# Patient Record
Sex: MALE | Race: WHITE | NOT HISPANIC OR LATINO
[De-identification: names, ages, dates, MRNs, and addresses within clinical notes are randomized per-mention and may not be internally consistent; named-entity substitution may affect disease eponyms.]

---

## 2023-01-24 ENCOUNTER — TRANSCRIPTION ENCOUNTER (OUTPATIENT)
Age: 63
End: 2023-01-24

## 2023-01-25 ENCOUNTER — OUTPATIENT (OUTPATIENT)
Dept: OUTPATIENT SERVICES | Facility: HOSPITAL | Age: 63
LOS: 1 days | End: 2023-01-25
Payer: COMMERCIAL

## 2023-01-25 DIAGNOSIS — Z12.11 ENCOUNTER FOR SCREENING FOR MALIGNANT NEOPLASM OF COLON: ICD-10-CM

## 2023-01-25 PROCEDURE — G0105: CPT

## 2023-10-26 ENCOUNTER — NON-APPOINTMENT (OUTPATIENT)
Age: 63
End: 2023-10-26

## 2023-10-26 ENCOUNTER — APPOINTMENT (OUTPATIENT)
Dept: NEUROSURGERY | Facility: CLINIC | Age: 63
End: 2023-10-26
Payer: COMMERCIAL

## 2023-10-26 VITALS
WEIGHT: 250 LBS | HEIGHT: 70 IN | BODY MASS INDEX: 35.79 KG/M2 | SYSTOLIC BLOOD PRESSURE: 114 MMHG | DIASTOLIC BLOOD PRESSURE: 78 MMHG | OXYGEN SATURATION: 96 % | HEART RATE: 69 BPM

## 2023-10-26 DIAGNOSIS — Z78.9 OTHER SPECIFIED HEALTH STATUS: ICD-10-CM

## 2023-10-26 DIAGNOSIS — Z82.61 FAMILY HISTORY OF ARTHRITIS: ICD-10-CM

## 2023-10-26 DIAGNOSIS — E78.1 PURE HYPERGLYCERIDEMIA: ICD-10-CM

## 2023-10-26 DIAGNOSIS — R20.0 ANESTHESIA OF SKIN: ICD-10-CM

## 2023-10-26 PROCEDURE — 99202 OFFICE O/P NEW SF 15 MIN: CPT

## 2023-10-26 RX ORDER — GABAPENTIN 300 MG
300 TABLET ORAL
Refills: 0 | Status: ACTIVE | COMMUNITY

## 2023-10-27 PROBLEM — R20.0 NUMBNESS, LIMB: Status: ACTIVE | Noted: 2023-10-27

## 2024-09-16 ENCOUNTER — INPATIENT (INPATIENT)
Facility: HOSPITAL | Age: 64
LOS: 10 days | Discharge: HOME | DRG: 566 | End: 2024-09-27
Attending: PHYSICAL MEDICINE & REHABILITATION | Admitting: PHYSICAL MEDICINE & REHABILITATION
Payer: COMMERCIAL

## 2024-09-16 VITALS
TEMPERATURE: 98 F | OXYGEN SATURATION: 98 % | SYSTOLIC BLOOD PRESSURE: 128 MMHG | HEIGHT: 70 IN | WEIGHT: 246.92 LBS | DIASTOLIC BLOOD PRESSURE: 77 MMHG | HEART RATE: 91 BPM | RESPIRATION RATE: 16 BRPM

## 2024-09-16 DIAGNOSIS — Z98.890 OTHER SPECIFIED POSTPROCEDURAL STATES: Chronic | ICD-10-CM

## 2024-09-16 DIAGNOSIS — Z96.653 PRESENCE OF ARTIFICIAL KNEE JOINT, BILATERAL: ICD-10-CM

## 2024-09-16 LAB — SARS-COV-2 RNA SPEC QL NAA+PROBE: SIGNIFICANT CHANGE UP

## 2024-09-16 RX ORDER — PANTOPRAZOLE SODIUM 40 MG/1
40 TABLET, DELAYED RELEASE ORAL
Refills: 0 | Status: DISCONTINUED | OUTPATIENT
Start: 2024-09-16 | End: 2024-09-27

## 2024-09-16 RX ORDER — SENNOSIDES 8.6 MG
2 TABLET ORAL AT BEDTIME
Refills: 0 | Status: DISCONTINUED | OUTPATIENT
Start: 2024-09-16 | End: 2024-09-17

## 2024-09-16 RX ORDER — ASPIRIN 325 MG
325 TABLET ORAL
Refills: 0 | Status: DISCONTINUED | OUTPATIENT
Start: 2024-09-16 | End: 2024-09-27

## 2024-09-16 RX ORDER — ACETAMINOPHEN 325 MG
975 TABLET ORAL EVERY 6 HOURS
Refills: 0 | Status: DISCONTINUED | OUTPATIENT
Start: 2024-09-16 | End: 2024-09-27

## 2024-09-16 RX ORDER — OXYCODONE HYDROCHLORIDE 30 MG/1
5 TABLET, FILM COATED, EXTENDED RELEASE ORAL EVERY 6 HOURS
Refills: 0 | Status: DISCONTINUED | OUTPATIENT
Start: 2024-09-16 | End: 2024-09-16

## 2024-09-16 RX ORDER — CRANBERRY FRUIT EXTRACT 650 MG
1000 CAPSULE ORAL DAILY
Refills: 0 | Status: DISCONTINUED | OUTPATIENT
Start: 2024-09-16 | End: 2024-09-27

## 2024-09-16 RX ORDER — OXYCODONE HYDROCHLORIDE 30 MG/1
10 TABLET, FILM COATED, EXTENDED RELEASE ORAL EVERY 6 HOURS
Refills: 0 | Status: DISCONTINUED | OUTPATIENT
Start: 2024-09-16 | End: 2024-09-16

## 2024-09-16 RX ORDER — GEMFIBROZIL 600 MG/1
600 TABLET, FILM COATED ORAL
Refills: 0 | Status: DISCONTINUED | OUTPATIENT
Start: 2024-09-16 | End: 2024-09-27

## 2024-09-16 RX ORDER — CYCLOBENZAPRINE HCL 10 MG
5 TABLET ORAL THREE TIMES A DAY
Refills: 0 | Status: DISCONTINUED | OUTPATIENT
Start: 2024-09-16 | End: 2024-09-27

## 2024-09-16 RX ORDER — GABAPENTIN 800 MG/1
600 TABLET, FILM COATED ORAL AT BEDTIME
Refills: 0 | Status: DISCONTINUED | OUTPATIENT
Start: 2024-09-16 | End: 2024-09-27

## 2024-09-16 RX ADMIN — Medication 2 TABLET(S): at 21:51

## 2024-09-16 RX ADMIN — Medication 975 MILLIGRAM(S): at 21:51

## 2024-09-16 RX ADMIN — GABAPENTIN 600 MILLIGRAM(S): 800 TABLET, FILM COATED ORAL at 21:58

## 2024-09-16 NOTE — H&P ADULT - NSHPPHYSICALEXAM_GEN_ALL_CORE
PHYSICAL EXAM  VITALS  T(C): 36.4 (09-16-24 @ 18:24), Max: 36.4 (09-16-24 @ 18:24)  HR: 91 (09-16-24 @ 18:24) (91 - 91)  BP: 128/77 (09-16-24 @ 18:24) (128/77 - 128/77)  RR: 16 (09-16-24 @ 18:24) (16 - 16)  SpO2: 98% (09-16-24 @ 18:24) (98% - 98%)    Gen - NAD, Comfortable  HEENT - NCAT, EOMI, MMM, PERRLA, Normal Conjunctivae  Neck - Supple, No limited ROM  Pulm - CTAB, No wheeze, No rhonchi, No crackles  Cardiovascular - RRR, S1S2, No murmurs  Chest - good chest expansion, good respiratory effort  Abdomen - Soft, NT/ND, +BS  Extremities - No C/C, no calf tenderness, BLE 1+ edema  Neuro-     Cognitive - awake, alert, oriented to person, place, date, year, and situation.  Able  to follow command     Communication - Fluent, Comprehensible, No dysarthria, No aphasia      Cranial Nerves -No facial asymmetry, Tongue midline, EOMI, Shoulder shrug intact     Motor - No focal deficits. Right /Left hemiparesis                    LEFT    UE - ShAB 5/5, EF 5/5, EE 5/5,  5/5                    RIGHT UE - ShAB 5/5, EF 5/5, EE 5/5,   5/5                    LEFT    LE - HF 5/5, KE 3/5, DF 5/5, PF 5/5                    RIGHT LE - HF 5/5, KE 2/5 limited 2/2 pain, DF 5/5, PF 5/5        Sensory - Intact  to LT bilaterally, decreased sensation to RLE (chronic)     Tone - normal  MSK: BL knee soreness  Psychiatric - Mood stable, Affect WNL  Skin:  LUE bruising, BLE ecchymosis, mepilex BL knees, CDI, L buttock scab

## 2024-09-16 NOTE — PATIENT PROFILE ADULT - FALL HARM RISK - RISK INTERVENTIONS
Assistance OOB with selected safe patient handling equipment/Assistance with ambulation/Communicate Fall Risk and Risk Factors to all staff, patient, and family/Discuss with provider need for PT consult/Monitor gait and stability/Reinforce activity limits and safety measures with patient and family/Visual Cue: Yellow wristband/Bed in lowest position, wheels locked, appropriate side rails in place/Call bell, personal items and telephone in reach/Instruct patient to call for assistance before getting out of bed or chair/Non-slip footwear when patient is out of bed/Foster to call system/Physically safe environment - no spills, clutter or unnecessary equipment/Purposeful Proactive Rounding/Room/bathroom lighting operational, light cord in reach

## 2024-09-16 NOTE — H&P ADULT - NSHPSOCIALHISTORY_GEN_ALL_CORE
Housing and DME: Lives alone with family nearby. Live in 3 story house with ramp to the front, chair lift to laundry. Walk in shower.     Prior Functional Status:  Patient was independent with ambulation, needed assistance for ADLs, and transfers with no AD    Current Functional Status:    09/15/2024 PT note:   Supine to/from sit:  CGA  Sit to/from stand:  CGA  RW Ambulation: 40 feet, CGA, RW, step through gait, assist present for WC follow    09/16/2024 OT note   LE Dressing without assistive device; Minimal assist    UE Dressing; LE Dressing with assisstive device;   without assistive device Pt requires CGA for most functional mobility at this time.   Pt req CGA for toileting transfers, and performs hygiene independently while seated.   He is able to perform basic grooming standing at the sink with CGA as well.  He is independent with feeding and UB dressing while seated.   Pt req min A for LB dressing and bathing

## 2024-09-16 NOTE — H&P ADULT - NSICDXPASTMEDICALHX_GEN_ALL_CORE_FT
PAST MEDICAL HISTORY:  Colostomy status     Diverticulitis of colon without hemorrhage     Hyperlipidemia      PAST MEDICAL HISTORY:  Colostomy status     Diverticulitis of colon without hemorrhage     Hyperlipidemia     Osteoarthritis

## 2024-09-16 NOTE — PATIENT PROFILE ADULT - DO YOU FEEL UNSAFE AT HOME, WORK, OR SCHOOL?
"Progress Note - Freddie Graham 64 y.o. male MRN: [id number]    Unit/Bed#: [facility] 251-02 Encounter: [id number]        Subjective:   Patient seen and examined at bedside after reviewing the chart and discussing the case with the caring staff.      Patient examined at bedside.  Patient has no acute symptoms.     Physical Exam   Vitals: Blood pressure 122/65, pulse 62, temperature (!) 97.2 °F (36.2 °C), temperature source Temporal, resp. rate 16, height 5' 9\" (1.753 m), weight 90.4 kg (199 lb 3.2 oz), SpO2 99%.,Body mass index is 29.42 kg/m².  Constitutional: Patient in no acute distress.  HEENT: PERR, EOMI, MMM.  Cardiovascular: Normal rate and regular rhythm.    Pulmonary/Chest: Effort normal and breath sounds normal.   Abdomen: Soft, + BS, NT.    Assessment/Plan:  Freddie Graham is a(n) 64 y.o. year old male schizophrenia.     1.  Cardiac with hx HTN, HLD.  I will put the patient on lisinopril 10 mg daily.  Toprol-XL will be discontinued due to noncompliance 2/25/2024.  2.  Tobacco abuse.  NRT.  3.  Hypothyroidism.  Not on levothyroxine.  TSH normal 2/20/24.  4.  BPH.  Continue Flomax 0.4 mg daily.  5.  Bilateral foot blisters.  SurePrep twice daily.  Podiatry consulted.  Add bacitracin twice daily.  I will get surgical shoe for the patient 2/24/2024.  6.  Vitamin D deficiency.  Patient started on vitamin D supplements.  7.  Vitamin B12 deficiency.  Patient started on vitamin B12 supplements.  8. Constipation. Senakot daily and Miralax as needed.   9. Dental caries/infection. Start patient on amoxicillin 500 mg TID for 7 days and magic mouthwash as needed. F/u with dentist outpatient.   10. DJD/back pain. Start patient on Voltaren gel four times daily.   " no

## 2024-09-16 NOTE — H&P ADULT - NSICDXFAMILYHX_GEN_ALL_CORE_FT
FAMILY HISTORY:  Father  Still living? Unknown  Family history of diverticulitis of colon, Age at diagnosis: Age Unknown    Mother  Still living? Yes, Estimated age: 81-90  Family history of diverticulitis of colon, Age at diagnosis: Age Unknown

## 2024-09-16 NOTE — H&P ADULT - HISTORY OF PRESENT ILLNESS
JENELLE COLEMAN is a 63 yo male with PMHx of high cholesterol, primary OA of b/l knees,  and diverticulitis s/p colectomy who presented to [facility] for elective bilateral total knee replacement. Patient had B/L TKR on 9/10/2024 with Dr. Thierno Tan.     Hospital Course was complicated by _______________     JENELLE COLEMAN is a 63 yo male with PMHx of high cholesterol, primary OA of b/l knees,  and diverticulitis s/p colectomy who presented to [facility] for elective bilateral total knee replacement with failed conservative treatment with medications. Patient had B/L TKR on 9/10/2024 with Dr. Thierno Tan. Hospital course unremarkable and without complications. Patient optimized, pain is well controlled and was evaluated by PM&R and therapy for functional deficits, gait/ADL impairments and acute rehabilitation was recommended. Patient was cleared for discharge to [facility] IRU on 9/16/24.

## 2024-09-16 NOTE — H&P ADULT - NSHPLABSRESULTS_GEN_ALL_CORE
LABS    CBC  9/12/24  WBC- 7.8  HgB-  9.4  Hct- 27.8  Platelets- 139    Chem: 9/12/24  Na- 136  K- 4.3  Cr- 0.7  BUN- 15  Glu- 121    RADIOLOGY    8/20/2024  XRAY  B/L Knees: Bilateral OA affecting medical compartments. Kellgren- Nghia grade 4  XRAY  Chest: No evidence of acute pulmonary embolism

## 2024-09-16 NOTE — H&P ADULT - NS ATTEND AMEND GEN_ALL_CORE FT
Rehab Attending- Patient seen and examined by me on 9/17 - Case discussed, above note reviewed by me with modifications made    patient seen and evaluated bedside  reports numbness/ burning pain LEs - will increase gabapentin to 3x/day  moved bowels yesterday- Senna/ miralx daily  voiding well  good ROM Both knees 0-90 SLR RLE 3+/5 Left 3-/5  TEDs BLEs when OOB    IMP Rehab quyen TKR- good candidate for intensive rehab- will tolerate three hours rehab daily      MEDICATIONS  (STANDING):  acetaminophen     Tablet .. 975 milliGRAM(s) Oral every 6 hours  aspirin 325 milliGRAM(s) Oral two times a day  cholecalciferol 1000 Unit(s) Oral daily  gabapentin 300 milliGRAM(s) Oral <User Schedule>  gabapentin 600 milliGRAM(s) Oral at bedtime  gemfibrozil 600 milliGRAM(s) Oral <User Schedule>  pantoprazole    Tablet 40 milliGRAM(s) Oral before breakfast  polyethylene glycol 3350 17 Gram(s) Oral daily    MEDICATIONS  (PRN):  cyclobenzaprine 5 milliGRAM(s) Oral three times a day PRN Muscle Spasm  oxyCODONE    IR 10 milliGRAM(s) Oral every 6 hours PRN Severe Pain (7 - 10)  oxyCODONE    IR 5 milliGRAM(s) Oral every 6 hours PRN Moderate Pain (4 - 6)  senna 2 Tablet(s) Oral at bedtime PRN Constipation                 9.5    5.79  )-----------( 227      ( 17 Sep 2024 05:40 )             28.7     09-17    142  |  104  |  17  ----------------------------<  100[H]  4.2   |  33[H]  |  0.85    Ca    9.2      17 Sep 2024 05:40    TPro  6.1  /  Alb  2.9[L]  /  TBili  1.0  /  DBili  x   /  AST  37  /  ALT  43  /  AlkPhos  69  09-17    Urinalysis Basic - ( 17 Sep 2024 05:40 )    Color: x / Appearance: x / SG: x / pH: x  Gluc: 100 mg/dL / Ketone: x  / Bili: x / Urobili: x   Blood: x / Protein: x / Nitrite: x   Leuk Esterase: x / RBC: x / WBC x   Sq Epi: x / Non Sq Epi: x / Bacteria: x    Vital Signs Last 24 Hrs  T(C): 36.4 (17 Sep 2024 08:48), Max: 36.7 (16 Sep 2024 20:23)  T(F): 97.6 (17 Sep 2024 08:48), Max: 98 (16 Sep 2024 20:23)  HR: 73 (17 Sep 2024 08:48) (73 - 91)  BP: 121/77 (17 Sep 2024 08:48) (121/77 - 128/77)  BP(mean): --  RR: 16 (17 Sep 2024 08:48) (16 - 17)  SpO2: 96% (17 Sep 2024 08:48) (96% - 98%)    Parameters below as of 17 Sep 2024 08:48  Patient On (Oxygen Delivery Method): room air    Gen - NAD, Comfortable  HEENT - NCAT, EOMI, MMM, PERRLA, Normal Conjunctivae  Neck - Supple, No limited ROM  Pulm - CTAB, No wheeze, No rhonchi, No crackles  Cardiovascular - RRR, S1S2, No murmurs  Chest - good chest expansion, good respiratory effort  Abdomen - Soft, NT/ND, +BS  Extremities - No C/C, no calf tenderness, BLE 1+ edema  Neuro-     Cognitive - awake, alert, oriented to person, place, date, year, and situation.  Able  to follow command     Communication - Fluent, Comprehensible, No dysarthria, No aphasia      Cranial Nerves -No facial asymmetry, Tongue midline, EOMI, Shoulder shrug intact     Motor - No focal deficits. Right /Left hemiparesis                    LEFT    UE - ShAB 5/5, EF 5/5, EE 5/5,  5/5                    RIGHT UE - ShAB 5/5, EF 5/5, EE 5/5,   5/5                    LEFT    LE - HF 5/5, KE 3/5, DF 5/5, PF 5/5                    RIGHT LE - HF 5/5, KE 2/5 limited 2/2 pain, DF 5/5, PF 5/5        Sensory - Intact  to LT bilaterally, decreased sensation to RLE (chronic)     Tone - normal  MSK: BL knee soreness

## 2024-09-16 NOTE — H&P ADULT - NSICDXPASTSURGICALHX_GEN_ALL_CORE_FT
PAST SURGICAL HISTORY:  H/O colonoscopy     History of colon resection February 2015    S/P meniscectomy

## 2024-09-16 NOTE — H&P ADULT - ASSESSMENT
JENELLE COLEMAN is a 65 yo male with PMHx of high cholesterol, primary OA of b/l knees,  and diverticulitis s/p colectomy who presented to [facility] for elective bilateral total knee replacement. Patient had B/L TKR on 9/10/2024 with Dr. Thierno Tan. Hospital Course complicated by __________. Admitted for multidisciplinary rehab program    #Bilateral Total Knee Replacment  - B/L TKR on 9/10 with Dr. Thierno Tan  - Continue multimodal pain control with Tylenol 975 QID, Mobic 7.5 mg D, and Gabapentin 600 mg HS  - Oxycodone as needed for break through pain  - Flexeril prn  -  mg BID for VTE ppx s/p B/L TKR  - Fall Precautions   - Comprehensive Multidisciplinary Rehab Program: Gait, ADL, Functional impairments; PT/OT/ SLP 3 hours a day 5 days a week      #Mood / Cognition:  - No concerns    #Sleep:  - Maintain quiet hours and low stim environment  - Melatonin PRN    #GI/Bowel:  - Scheduled Colace 100 mg BID  - PRN: Miralax 17 GM BID, Senna 2 tabs daily  - Simethicone prn  - GI ppx: Protonix 40 mg dialy    #/Bladder:  - Monitor PVR if no void in 8h; SC for >400 cc  - Toileting schedule q4h    #Diet / Dysphagia:    - Diet: Regular/TLC    #Skin/ Pressure Injury Prevention:  - assessment on admission   - Incisions: ______________  - Turn Q2hrs in bed while awake, OOB to Chair, PT/OT/SLP     #DVT prophylaxis:  -   BID  - SCDs  - Last doppler on _____________    #Precautions/ Restrictions  - Falls  - Ortho:      Weight bearing status: _____________     ROM restrictions: _______________  - COVID PCR: Pending    --------------------------------------------  Outpatient Follow up:    _________________________      --------------------------------------------      MEDICAL PROGNOSIS: GOOD            REHAB POTENTIAL: GOOD             ESTIMATED DISPOSITION: HOME WITH HOME CARE            ELOS: 10-14 Days   EXPECTED THERAPY:     P.T. 1hr/day       O.T. 1hr/day      S.L.P. 1hr/day     P&O Unnecessary     EXP FREQUENCY: 5 days per 7 day period     PRESCREEN COMPARISON:   I have reviewed the prescreen information and I have found no relevant changes between the preadmission screening and my post admission evaluation     RATIONALE FOR INPATIENT ADMISSION - Patient demonstrates the following: (check all that apply)  [X] Medically appropriate for rehabilitation admission  [X] Has attainable rehab goals with an appropriate initial discharge plan  [X] Has rehabilitation potential (expected to make a significant improvement within a reasonable period of time)   [X] Requires close medical management by a rehab physician, rehab nursing care, Hospitalist and comprehensive interdisciplinary team (including PT, OT, & or SLP, Prosthetics and Orthotics)   JENELLE COLEMAN is a 65 yo male with PMHx of high cholesterol, primary OA of b/l knees,  and diverticulitis s/p colectomy who presented to [facility] for elective bilateral total knee replacement. Patient had B/L TKR on 9/10/2024 with Dr. Thierno Tan. Hospital course unremarkable and without complications. Now admitted to [facility] after for initiation of a multidisciplinary rehab program consisting focused on functional mobility, transfers and ADLs.    #Bilateral Total Knee Replacement  - B/L TKR on 9/10 with Dr. Thierno Tan  - Continue multimodal pain control with Tylenol 975 QID, Mobic 7.5 mg D, and Gabapentin 600 mg HS  - Oxycodone as needed for break through pain  - Flexeril prn  -  mg BID for VTE ppx s/p B/L TKR x 42 days  - Fall Precautions   - Comprehensive Multidisciplinary Rehab Program: Gait, ADL, Functional impairments; PT/OT/ SLP 3 hours a day 5 days a week  -Vit D 1000U daily    #HLD  -Lopid 600mg BID     #Sleep:  - Maintain quiet hours and low stim environment  - Melatonin 6mg PRN    #GI/Bowel:  - PRN: Miralax 17 GM BID, Senna 2 tabs daily  - Simethicone prn  - GI ppx: Protonix 40 mg daily    #/Bladder:  - Bladder scan x 1 on admission; SC for >400 cc  - Toileting schedule q4h    #Diet / Dysphagia:    - Diet: Regular/TLC    #Skin/ Pressure Injury Prevention:  - assessment on admission     #DVT prophylaxis:  -   BID x 42 days  - TEDs    #Precautions/ Restrictions  - Falls  - Ortho:      Weight bearing status: BLE WBAT    --------------------------------------------  Outpatient Follow up:    _________________________      --------------------------------------------      MEDICAL PROGNOSIS: GOOD            REHAB POTENTIAL: GOOD             ESTIMATED DISPOSITION: HOME WITH HOME CARE            ELOS: 10-14 Days   EXPECTED THERAPY:     P.T. 1hr/day       O.T. 1hr/day      S.L.P. 1hr/day     P&O Unnecessary     EXP FREQUENCY: 5 days per 7 day period     PRESCREEN COMPARISON:   I have reviewed the prescreen information and I have found no relevant changes between the preadmission screening and my post admission evaluation     RATIONALE FOR INPATIENT ADMISSION - Patient demonstrates the following: (check all that apply)  [X] Medically appropriate for rehabilitation admission  [X] Has attainable rehab goals with an appropriate initial discharge plan  [X] Has rehabilitation potential (expected to make a significant improvement within a reasonable period of time)   [X] Requires close medical management by a rehab physician, rehab nursing care, Hospitalist and comprehensive interdisciplinary team (including PT, OT, & or SLP, Prosthetics and Orthotics)     JENELLE COLEMAN is a 63 yo male with PMHx of high cholesterol, primary OA of b/l knees,  and diverticulitis s/p colectomy who presented to [facility] for elective bilateral total knee replacement. Patient had B/L TKR on 9/10/2024 with Dr. Thierno Tan. Hospital course unremarkable and without complications. Now admitted to [facility] after for initiation of a multidisciplinary rehab program consisting focused on functional mobility, transfers and ADLs.    #Bilateral Total Knee Replacement  - B/L TKR on 9/10 with Dr. Thierno Tan  - Continue multimodal pain control with Tylenol 975 QID, and Gabapentin 600 mg HS  - Oxycodone 5-10mg PRN    - Flexeril 5mg TID PRN  -  mg BID for VTE ppx s/p B/L TKR x 42 days (end date 10/22)  - Fall Precautions   - Comprehensive Multidisciplinary Rehab Program: Gait, ADL, Functional impairments; PT/OT/ SLP 3 hours a day 5 days a week  -Vit D 1000U daily  -Remove mepilex dressing on 9/17 (POD#7)    #HLD  -Lopid 600mg BID     #Sleep:  - Maintain quiet hours and low stim environment  - Melatonin 6mg PRN    #GI/Bowel:  - PRN: Miralax 17 GM BID, Senna 2 tabs daily  - GI ppx: Protonix 40 mg daily    #/Bladder:  - Bladder scan x 1 on admission; SC for >400 cc  - Toileting schedule q4h    #Diet / Dysphagia:    - Diet: Regular/TLC    #Skin/ Pressure Injury Prevention:  - assessment on admission:  LUE bruising, BLE ecchymosis, mepilex BL knees, CDI, L buttock scab       #DVT prophylaxis:  -   BID x 42 days (end date 10/22)  - TEDs    #Precautions/ Restrictions  - Falls  - Ortho:      Weight bearing status: BLE WBAT    --------------------------------------------  Outpatient Follow up:    Thierno Solorio MD  Orthopedic Surgeon  [address]  [address]  [place], [place], [address]    MEDICAL PROGNOSIS: GOOD            REHAB POTENTIAL: GOOD             ESTIMATED DISPOSITION: HOME WITH HOME CARE            ELOS: 10-14 Days   EXPECTED THERAPY:     P.T. 1hr/day       O.T. 1hr/day      S.L.P. 1hr/day     P&O Unnecessary     EXP FREQUENCY: 5 days per 7 day period     PRESCREEN COMPARISON:   I have reviewed the prescreen information and I have found no relevant changes between the preadmission screening and my post admission evaluation     RATIONALE FOR INPATIENT ADMISSION - Patient demonstrates the following: (check all that apply)  [X] Medically appropriate for rehabilitation admission  [X] Has attainable rehab goals with an appropriate initial discharge plan  [X] Has rehabilitation potential (expected to make a significant improvement within a reasonable period of time)   [X] Requires close medical management by a rehab physician, rehab nursing care, Hospitalist and comprehensive interdisciplinary team (including PT, OT, & or SLP, Prosthetics and Orthotics)

## 2024-09-16 NOTE — H&P ADULT - NSHPREVIEWOFSYSTEMS_GEN_ALL_CORE
REVIEW OF SYSTEMS  Constitutional: No fever, No Chills, No fatigue  HEENT: No eye pain, No visual disturbances, No difficulty hearing  Pulm: No cough,  No shortness of breath  Cardio: No chest pain, No palpitations  GI:  No abdominal pain, No nausea, No vomiting, No diarrhea, No constipation  : No dysuria, No frequency, No hematuria  Neuro: No headaches, No memory loss, + weakness, + numbness, No tremors  Skin: No itching, No rashes, No lesions   Endo: No temperature intolerance  MSK: No joint pain, No joint swelling, No muscle pain, No Neck pain,  No back pain  Psych:  No depression, No anxiety REVIEW OF SYSTEMS  Constitutional: No fever, No Chills, No fatigue  HEENT: No eye pain, No visual disturbances, No difficulty hearing  Pulm: No cough,  No shortness of breath  Cardio: No chest pain, No palpitations  GI:  No abdominal pain, No nausea, No vomiting, No diarrhea, No constipation  : No dysuria, No frequency, No hematuria  Neuro: No headaches, No memory loss, + weakness, + numbness RLE (chronic), No tremors  Skin: No itching, No rashes, No lesions   Endo: No temperature intolerance  MSK: +BL knee pain, No joint swelling, No muscle pain, No Neck pain,  No back pain  Psych:  No depression, No anxiety

## 2024-09-17 LAB
ALBUMIN SERPL ELPH-MCNC: 2.9 G/DL — LOW (ref 3.3–5)
ALP SERPL-CCNC: 69 U/L — SIGNIFICANT CHANGE UP (ref 40–120)
ALT FLD-CCNC: 43 U/L — SIGNIFICANT CHANGE UP (ref 10–45)
ANION GAP SERPL CALC-SCNC: 5 MMOL/L — SIGNIFICANT CHANGE UP (ref 5–17)
AST SERPL-CCNC: 37 U/L — SIGNIFICANT CHANGE UP (ref 10–40)
BASOPHILS # BLD AUTO: 0.06 K/UL — SIGNIFICANT CHANGE UP (ref 0–0.2)
BASOPHILS NFR BLD AUTO: 1 % — SIGNIFICANT CHANGE UP (ref 0–2)
BILIRUB SERPL-MCNC: 1 MG/DL — SIGNIFICANT CHANGE UP (ref 0.2–1.2)
BUN SERPL-MCNC: 17 MG/DL — SIGNIFICANT CHANGE UP (ref 7–23)
CALCIUM SERPL-MCNC: 9.2 MG/DL — SIGNIFICANT CHANGE UP (ref 8.4–10.5)
CHLORIDE SERPL-SCNC: 104 MMOL/L — SIGNIFICANT CHANGE UP (ref 96–108)
CO2 SERPL-SCNC: 33 MMOL/L — HIGH (ref 22–31)
CREAT SERPL-MCNC: 0.85 MG/DL — SIGNIFICANT CHANGE UP (ref 0.5–1.3)
EGFR: 96 ML/MIN/1.73M2 — SIGNIFICANT CHANGE UP
EOSINOPHIL # BLD AUTO: 0.24 K/UL — SIGNIFICANT CHANGE UP (ref 0–0.5)
EOSINOPHIL NFR BLD AUTO: 4.1 % — SIGNIFICANT CHANGE UP (ref 0–6)
GLUCOSE SERPL-MCNC: 100 MG/DL — HIGH (ref 70–99)
HCT VFR BLD CALC: 28.7 % — LOW (ref 39–50)
HGB BLD-MCNC: 9.5 G/DL — LOW (ref 13–17)
IMM GRANULOCYTES NFR BLD AUTO: 0.3 % — SIGNIFICANT CHANGE UP (ref 0–0.9)
LYMPHOCYTES # BLD AUTO: 1.56 K/UL — SIGNIFICANT CHANGE UP (ref 1–3.3)
LYMPHOCYTES # BLD AUTO: 26.9 % — SIGNIFICANT CHANGE UP (ref 13–44)
MCHC RBC-ENTMCNC: 30.6 PG — SIGNIFICANT CHANGE UP (ref 27–34)
MCHC RBC-ENTMCNC: 33.1 GM/DL — SIGNIFICANT CHANGE UP (ref 32–36)
MCV RBC AUTO: 92.6 FL — SIGNIFICANT CHANGE UP (ref 80–100)
MONOCYTES # BLD AUTO: 0.79 K/UL — SIGNIFICANT CHANGE UP (ref 0–0.9)
MONOCYTES NFR BLD AUTO: 13.6 % — SIGNIFICANT CHANGE UP (ref 2–14)
NEUTROPHILS # BLD AUTO: 3.12 K/UL — SIGNIFICANT CHANGE UP (ref 1.8–7.4)
NEUTROPHILS NFR BLD AUTO: 54.1 % — SIGNIFICANT CHANGE UP (ref 43–77)
NRBC # BLD: 0 /100 WBCS — SIGNIFICANT CHANGE UP (ref 0–0)
PLATELET # BLD AUTO: 227 K/UL — SIGNIFICANT CHANGE UP (ref 150–400)
POTASSIUM SERPL-MCNC: 4.2 MMOL/L — SIGNIFICANT CHANGE UP (ref 3.5–5.3)
POTASSIUM SERPL-SCNC: 4.2 MMOL/L — SIGNIFICANT CHANGE UP (ref 3.5–5.3)
PROT SERPL-MCNC: 6.1 G/DL — SIGNIFICANT CHANGE UP (ref 6–8.3)
RBC # BLD: 3.1 M/UL — LOW (ref 4.2–5.8)
RBC # FLD: 14.1 % — SIGNIFICANT CHANGE UP (ref 10.3–14.5)
SODIUM SERPL-SCNC: 142 MMOL/L — SIGNIFICANT CHANGE UP (ref 135–145)
WBC # BLD: 5.79 K/UL — SIGNIFICANT CHANGE UP (ref 3.8–10.5)
WBC # FLD AUTO: 5.79 K/UL — SIGNIFICANT CHANGE UP (ref 3.8–10.5)

## 2024-09-17 PROCEDURE — 99222 1ST HOSP IP/OBS MODERATE 55: CPT | Mod: GC

## 2024-09-17 PROCEDURE — 99253 IP/OBS CNSLTJ NEW/EST LOW 45: CPT

## 2024-09-17 PROCEDURE — 99221 1ST HOSP IP/OBS SF/LOW 40: CPT

## 2024-09-17 RX ORDER — SENNOSIDES 8.6 MG
2 TABLET ORAL AT BEDTIME
Refills: 0 | Status: DISCONTINUED | OUTPATIENT
Start: 2024-09-17 | End: 2024-09-27

## 2024-09-17 RX ORDER — GABAPENTIN 800 MG/1
300 TABLET, FILM COATED ORAL
Refills: 0 | Status: DISCONTINUED | OUTPATIENT
Start: 2024-09-17 | End: 2024-09-27

## 2024-09-17 RX ADMIN — GABAPENTIN 600 MILLIGRAM(S): 800 TABLET, FILM COATED ORAL at 21:21

## 2024-09-17 RX ADMIN — Medication 325 MILLIGRAM(S): at 06:26

## 2024-09-17 RX ADMIN — Medication 975 MILLIGRAM(S): at 18:29

## 2024-09-17 RX ADMIN — Medication 975 MILLIGRAM(S): at 13:58

## 2024-09-17 RX ADMIN — Medication 975 MILLIGRAM(S): at 07:05

## 2024-09-17 RX ADMIN — Medication 975 MILLIGRAM(S): at 23:36

## 2024-09-17 RX ADMIN — Medication 975 MILLIGRAM(S): at 06:26

## 2024-09-17 RX ADMIN — Medication 975 MILLIGRAM(S): at 01:46

## 2024-09-17 RX ADMIN — PANTOPRAZOLE SODIUM 40 MILLIGRAM(S): 40 TABLET, DELAYED RELEASE ORAL at 06:27

## 2024-09-17 RX ADMIN — Medication 1000 UNIT(S): at 13:50

## 2024-09-17 RX ADMIN — GABAPENTIN 300 MILLIGRAM(S): 800 TABLET, FILM COATED ORAL at 15:07

## 2024-09-17 RX ADMIN — GEMFIBROZIL 600 MILLIGRAM(S): 600 TABLET, FILM COATED ORAL at 08:43

## 2024-09-17 RX ADMIN — Medication 975 MILLIGRAM(S): at 17:59

## 2024-09-17 RX ADMIN — Medication 975 MILLIGRAM(S): at 12:58

## 2024-09-17 RX ADMIN — Medication 325 MILLIGRAM(S): at 17:59

## 2024-09-17 RX ADMIN — Medication 17 GRAM(S): at 19:54

## 2024-09-17 NOTE — DIETITIAN INITIAL EVALUATION ADULT - PERTINENT MEDS FT
MEDICATIONS  (STANDING):  acetaminophen     Tablet .. 975 milliGRAM(s) Oral every 6 hours  aspirin 325 milliGRAM(s) Oral two times a day  cholecalciferol 1000 Unit(s) Oral daily  gabapentin 600 milliGRAM(s) Oral at bedtime  gemfibrozil 600 milliGRAM(s) Oral <User Schedule>  pantoprazole    Tablet 40 milliGRAM(s) Oral before breakfast  senna 2 Tablet(s) Oral at bedtime    MEDICATIONS  (PRN):  cyclobenzaprine 5 milliGRAM(s) Oral three times a day PRN Muscle Spasm  oxyCODONE    IR 10 milliGRAM(s) Oral every 6 hours PRN Severe Pain (7 - 10)  oxyCODONE    IR 5 milliGRAM(s) Oral every 6 hours PRN Moderate Pain (4 - 6)  polyethylene glycol 3350 17 Gram(s) Oral daily PRN Constipation

## 2024-09-17 NOTE — DIETITIAN INITIAL EVALUATION ADULT - PERTINENT LABORATORY DATA
09-17    142  |  104  |  17  ----------------------------<  100[H]  4.2   |  33[H]  |  0.85    Ca    9.2      17 Sep 2024 05:40    TPro  6.1  /  Alb  2.9[L]  /  TBili  1.0  /  DBili  x   /  AST  37  /  ALT  43  /  AlkPhos  69  09-17

## 2024-09-17 NOTE — CONSULT NOTE ADULT - ASSESSMENT
JENELLE COLEMAN is a 65 yo male with PMHx of hypertriglyceridemia, primary OA of b/l knees, h/o diverticulitis s/p colectomy who presented to [facility] for elective bilateral total knee replacement. Patient had B/L TKR on 9/10/2024 with Dr. Thierno Tan. Hospital course unremarkable and without complications. Admitted to [facility] for initiation of a multidisciplinary rehab program consisting focused on functional mobility, transfers and ADLs.    #Bilateral Total Knee Replacement  - B/L TKR on 9/10 with Dr. Thierno Tan  - Continue multimodal pain control with Tylenol 975 QID, and Gabapentin 600 mg HS  - Oxycodone 5-10mg PRN    - Flexeril 5mg TID PRN  -  mg BID for VTE ppx s/p B/L TKR x 42 days (end date 10/22)  - Fall Precautions   - Comprehensive Multidisciplinary Rehab Program: Gait, ADL, Functional impairments  -Vit D 1000U daily  -Remove mepilex dressing on 9/17 (POD#7)    #HLD  -continue Lopid 600mg BID     #Sleep:  - Maintain quiet hours and low stim environment  - Melatonin 6mg PRN    #GI/Bowel:  - Senna 2 tabs daily, Miralax 17 mg daily change to scheduled per patient's request  - GI ppx: Protonix 40 mg daily    #/Bladder:  - Bladder scan x 1 on admission; SC for >400 cc  - Toileting schedule q4h      #DVT prophylaxis:  -   BID x 42 days (end date 10/22)  - TEDs     Detail Level: Detailed Quality 226: Preventive Care And Screening: Tobacco Use: Screening And Cessation Intervention: Patient screened for tobacco use and is an ex/non-smoker

## 2024-09-17 NOTE — DIETITIAN INITIAL EVALUATION ADULT - NSICDXPASTMEDICALHX_GEN_ALL_CORE_FT
PAST MEDICAL HISTORY:  Colostomy status     Diverticulitis of colon without hemorrhage     Hyperlipidemia     Osteoarthritis

## 2024-09-17 NOTE — DIETITIAN INITIAL EVALUATION ADULT - ORAL INTAKE PTA/DIET HISTORY
Patient Does Not Follow Diet @Home But Tries to Limit Carbohydrate Intake  Consumes 2 Meals a Day   Usually Cooks For Self  Does Take Vitamin/Supplements @Home (Vitamin D, Probiotic)

## 2024-09-17 NOTE — DIETITIAN INITIAL EVALUATION ADULT - FACTORS AFF FOOD INTAKE
States Good PO Intake/Appetite over Last Week   Denies Recent Changes/Decrease in Meal Consumption (Per Patient)/none

## 2024-09-17 NOTE — CONSULT NOTE ADULT - SUBJECTIVE AND OBJECTIVE BOX
Patient is a 64y old  Male who presents with a chief complaint of Presence of both artificial knee joints     (17 Sep 2024 10:39)      HPI:  JENELLE COLEMAN is a 65 yo male with PMHx significant for Hypertriglyceridemia,  primary OA of b/l knees,  h/o  diverticulitis s/p colectomy who presented to [facility] for elective bilateral total knee replacement after failing conservative treatment.  Patient had B/L TKR on 9/10/2024 with Dr. Thierno Tan. Hospital course was unremarkable and without complications. Patient  was evaluated by PM&R and therapy for functional deficits, gait/ADL impairments and acute rehabilitation was recommended. Patient was admitted to [facility] IRU on 9/16/24. (16 Sep 2024 12:55)   Patient states that his R knee is much better than L knee. He has no significant pain and good ROM. For His L knee, he is still having some extension issues.   Patient denies CP, SOB, Abdominal pain, dizziness, nausea, vomiting, dysuria.   He has chronic R side numbness from spinal stenosis for which he takes Gabapentin.   He usually takes Colace and Miralax at home daily and wants to have similar regimen while he is in rehab  He had no overnight issu    PAST MEDICAL & SURGICAL HISTORY:  Hyperlipidemia  H/O Colostomy  with reversal  H/O Diverticulitis of colon without hemorrhage  Osteoarthritis  H/O colonoscopy  History of colon resection  February 2015  S/P meniscectomy  s/p Cervical spine surgry        FAMILY HISTORY  Father: htn, diverticulitis  Mother: had diverticulitis      SOCIAL HISTORY  quit smoking in 2001, prior to that smoked quater to half a pack per day  drinks approximately 5 alcoholic beverages per week          ALLERGIES:  Allergies    SEASONAL ALLERGIES - STUFFY NOSE - USES LORATIDINE AND FLONASE DAILY (Other)  No Known Drug Allergies        Medications    acetaminophen     Tablet .. 975 milliGRAM(s) Oral every 6 hours  aspirin 325 milliGRAM(s) Oral two times a day  cholecalciferol 1000 Unit(s) Oral daily  cyclobenzaprine 5 milliGRAM(s) Oral three times a day PRN  gabapentin 600 milliGRAM(s) Oral at bedtime  gabapentin 300 milliGRAM(s) Oral <User Schedule>  gemfibrozil 600 milliGRAM(s) Oral <User Schedule>  oxyCODONE    IR 10 milliGRAM(s) Oral every 6 hours PRN  oxyCODONE    IR 5 milliGRAM(s) Oral every 6 hours PRN  pantoprazole    Tablet 40 milliGRAM(s) Oral before breakfast  polyethylene glycol 3350 17 Gram(s) Oral daily PRN  senna 2 Tablet(s) Oral at bedtime      REVIEW OF SYSTEMS:  CONSTITUTIONAL: No fever, weight loss, or fatigue  EYES: No eye pain, visual disturbances, or discharge  RESPIRATORY: No cough, wheezing, chills or hemoptysis; No shortness of breath  CARDIOVASCULAR: No chest pain, palpitations, dizziness, or leg swelling  GASTROINTESTINAL: No abdominal or epigastric pain. No nausea, vomiting, or hematemesis; No diarrhea or constipation. No melena or hematochezia.  GENITOURINARY: No dysuria, frequency, hematuria, or incontinence  NEUROLOGICAL: No headaches, memory loss, loss of strength, numbness, or tremors  SKIN: No itching, burning, rashes, or lesions   MUSCULOSKELETAL: No joint pain or swelling; No muscle, back, or extremity pain  PSYCHIATRIC: No depression, anxiety, mood swings, or difficulty sleeping    ALL OTHER SYSTEMS REVIEWED AND ARE NEGATIVE    VITAL SIGNS:  T(C): 36.4 (09-17-24 @ 08:48), Max: 36.7 (09-16-24 @ 20:23)  HR: 73 (09-17-24 @ 08:48) (73 - 91)  BP: 121/77 (09-17-24 @ 08:48) (121/77 - 128/77)  RR: 16 (09-17-24 @ 08:48) (16 - 17)  SpO2: 96% (09-17-24 @ 08:48) (96% - 98%)  Wt(kg): --Vital Signs Last 24 Hrs  T(C): 36.4 (17 Sep 2024 08:48), Max: 36.7 (16 Sep 2024 20:23)  T(F): 97.6 (17 Sep 2024 08:48), Max: 98 (16 Sep 2024 20:23)  HR: 73 (17 Sep 2024 08:48) (73 - 91)  BP: 121/77 (17 Sep 2024 08:48) (121/77 - 128/77)  BP(mean): --  RR: 16 (17 Sep 2024 08:48) (16 - 17)  SpO2: 96% (17 Sep 2024 08:48) (96% - 98%)    Parameters below as of 17 Sep 2024 08:48  Patient On (Oxygen Delivery Method): room air        PHYSICAL EXAM:  GENERAL: NAD  HENT:  Atraumatic, Normocephalic; No tonsillar erythema, exudates. Moist mucous membranes  EYES: EOMI, PERRLA, conjunctiva and sclera clear;  NECK: Supple, No JVD,  NERVOUS SYSTEM:  Motor Strength 5/5 B/L upper and lower extremities; CN II-XII intact  CHEST/LUNG: Clear to auscultation bilaterally; No rales, rhonchi, wheezing  HEART: Regular rate and rhythm; No murmurs. Mild b/l LE edema  ABDOMEN: Soft, Nontender, Nondistended; Bowel sounds present;  EXTREMITIES:  2+ Peripheral Pulses, No clubbing, cyanosis  SKIN: No rashes or lesions  PSYCH: Appropriate affect; Alert & Oriented x 3;     LABS:                        9.5    5.79  )-----------( 227      ( 17 Sep 2024 05:40 )             28.7     09-17    142  |  104  |  17  ----------------------------<  100[H]  4.2   |  33[H]  |  0.85    Ca    9.2      17 Sep 2024 05:40    TPro  6.1  /  Alb  2.9[L]  /  TBili  1.0  /  DBili  x   /  AST  37  /  ALT  43  /  AlkPhos  69  09-17      Urinalysis Basic - ( 17 Sep 2024 05:40 )    Color: x / Appearance: x / SG: x / pH: x  Gluc: 100 mg/dL / Ketone: x  / Bili: x / Urobili: x   Blood: x / Protein: x / Nitrite: x   Leuk Esterase: x / RBC: x / WBC x   Sq Epi: x / Non Sq Epi: x / Bacteria: x       CAPILLARY BLOOD GLUCOSE            Urinalysis Basic - ( 17 Sep 2024 05:40 )    Color: x / Appearance: x / SG: x / pH: x  Gluc: 100 mg/dL / Ketone: x  / Bili: x / Urobili: x   Blood: x / Protein: x / Nitrite: x   Leuk Esterase: x / RBC: x / WBC x   Sq Epi: x / Non Sq Epi: x / Bacteria: x          Previous Consultant(s) Notes Reviewed:  YES  Care Discussed with Consultants/Other Providers YES

## 2024-09-17 NOTE — DIETITIAN INITIAL EVALUATION ADULT - NS FNS DIET ORDER
Regular Diet (IDDSI Level 7) w/ Thin Liquids (IDDSI Level 0)  Education Provided on Proper Nutrition

## 2024-09-17 NOTE — DIETITIAN INITIAL EVALUATION ADULT - OTHER INFO
Initial Nutrition Assessment   64yr Old Male   Denies Food Allergy/Intolerance  Tolerates Diet Well - No Chewing/Swallowing Complications (Per Patient)  No Pressure Ulcers (as Per Nursing Flow Sheets)  No Edema Noted (as Per Nursing Flow Sheets)  No Recent Nausea/Vomiting/Diarrhea/Constipation (as Per Patient)

## 2024-09-18 PROCEDURE — 99232 SBSQ HOSP IP/OBS MODERATE 35: CPT | Mod: GC

## 2024-09-18 PROCEDURE — 99232 SBSQ HOSP IP/OBS MODERATE 35: CPT

## 2024-09-18 RX ADMIN — Medication 975 MILLIGRAM(S): at 12:05

## 2024-09-18 RX ADMIN — PANTOPRAZOLE SODIUM 40 MILLIGRAM(S): 40 TABLET, DELAYED RELEASE ORAL at 05:32

## 2024-09-18 RX ADMIN — GEMFIBROZIL 600 MILLIGRAM(S): 600 TABLET, FILM COATED ORAL at 08:18

## 2024-09-18 RX ADMIN — GABAPENTIN 600 MILLIGRAM(S): 800 TABLET, FILM COATED ORAL at 21:14

## 2024-09-18 RX ADMIN — Medication 975 MILLIGRAM(S): at 06:32

## 2024-09-18 RX ADMIN — Medication 1000 UNIT(S): at 12:04

## 2024-09-18 RX ADMIN — Medication 975 MILLIGRAM(S): at 18:16

## 2024-09-18 RX ADMIN — Medication 325 MILLIGRAM(S): at 18:16

## 2024-09-18 RX ADMIN — Medication 975 MILLIGRAM(S): at 05:32

## 2024-09-18 RX ADMIN — GABAPENTIN 300 MILLIGRAM(S): 800 TABLET, FILM COATED ORAL at 08:18

## 2024-09-18 RX ADMIN — Medication 975 MILLIGRAM(S): at 18:46

## 2024-09-18 RX ADMIN — Medication 975 MILLIGRAM(S): at 00:36

## 2024-09-18 RX ADMIN — Medication 2 TABLET(S): at 21:14

## 2024-09-18 RX ADMIN — Medication 325 MILLIGRAM(S): at 05:32

## 2024-09-18 RX ADMIN — Medication 975 MILLIGRAM(S): at 13:05

## 2024-09-18 RX ADMIN — Medication 17 GRAM(S): at 12:05

## 2024-09-18 RX ADMIN — GABAPENTIN 300 MILLIGRAM(S): 800 TABLET, FILM COATED ORAL at 13:34

## 2024-09-18 NOTE — PROGRESS NOTE ADULT - ASSESSMENT
JENELLE COLEMAN is a 63 yo male with PMHx of high cholesterol, primary OA of b/l knees,  and diverticulitis s/p colectomy who presented to [facility] for elective bilateral total knee replacement. Patient had B/L TKR on 9/10/2024 with Dr. Thierno Tan. Hospital course unremarkable and without complications. Now admitted to [facility] after for initiation of a multidisciplinary rehab program consisting focused on functional mobility, transfers and ADLs.    #Bilateral Total Knee Replacement  - B/L TKR on 9/10 with Dr. Thierno Tan  - Continue multimodal pain control with Tylenol 975 QID, and Gabapentin 600 mg HS  - Oxycodone 5-10mg PRN    - Flexeril 5mg TID PRN  -  mg BID for VTE ppx s/p B/L TKR x 42 days (end date 10/22)  - Fall Precautions   - Comprehensive Multidisciplinary Rehab Program: Gait, ADL, Functional impairments; PT/OT/ SLP 3 hours a day 5 days a week  -Vit D 1000U daily  -Remove mepilex dressing on 9/17 (POD#7)- done    #HLD  -Lopid 600mg BID     #Sleep:  - Maintain quiet hours and low stim environment  - Melatonin 6mg PRN    #GI/Bowel:  - PRN: Miralax 17 GM BID, Senna 2 tabs daily- last Bm 9/16  - GI ppx: Protonix 40 mg daily    #/Bladder:  voiding well    #Diet / Dysphagia:    - Diet: Regular/TLC    #Skin/ Pressure Injury Prevention:  - assessment on admission:  LUE bruising, BLE ecchymosis, mepilex BL knees DCd, CDI, L buttock scab       #DVT prophylaxis:  -   BID x 42 days (end date 10/22)  - TEDs    #Precautions/ Restrictions  - Falls  - Ortho:      Weight bearing status: BLE WBAT

## 2024-09-18 NOTE — PROGRESS NOTE ADULT - ASSESSMENT
JENELLE COLEMAN is a 63 yo male with PMHx of hypertriglyceridemia, primary OA of b/l knees, h/o diverticulitis s/p colectomy who presented to [facility] for elective bilateral total knee replacement. Patient had B/L TKR on 9/10/2024 with Dr. Thierno Tan. Hospital course unremarkable and without complications. Admitted to [facility] for initiation of a multidisciplinary rehab program consisting focused on functional mobility, transfers and ADLs.    #Bilateral Total Knee Replacement  - B/L TKR on 9/10 with Dr. Thierno Tan  - Continue  with Tylenol 975 QID, and Gabapentin 600 mg   - Oxycodone 5-10mg PRN    - Flexeril 5mg TID PRN  -  mg BID for VTE ppx s/p B/L TKR x 42 days (end date 10/22)  - Fall Precautions   - Comprehensive Multidisciplinary Rehab Program: Gait, ADL, Functional impairments  -Vit D 1000U daily  -Remove mepilex dressing on 9/17 (POD#7)    #HLD  -continue Lopid 600mg BID     #Sleep:  - Maintain quiet hours and low stim environment  - Melatonin 6mg PRN    #GI/Bowel:  - Senna 2 tabs daily, Miralax 17 mg daily change to scheduled per patient's request  - GI ppx: Protonix 40 mg daily    #/Bladder:  - Bladder scan x 1 on admission; SC for >400 cc  - Toileting schedule q4h      #DVT prophylaxis:  -   BID x 42 days (end date 10/22)  - TEDs

## 2024-09-19 LAB
ALBUMIN SERPL ELPH-MCNC: 3.1 G/DL — LOW (ref 3.3–5)
ALP SERPL-CCNC: 83 U/L — SIGNIFICANT CHANGE UP (ref 40–120)
ALT FLD-CCNC: 58 U/L — HIGH (ref 10–45)
ANION GAP SERPL CALC-SCNC: 6 MMOL/L — SIGNIFICANT CHANGE UP (ref 5–17)
AST SERPL-CCNC: 30 U/L — SIGNIFICANT CHANGE UP (ref 10–40)
BASOPHILS # BLD AUTO: 0.06 K/UL — SIGNIFICANT CHANGE UP (ref 0–0.2)
BASOPHILS NFR BLD AUTO: 0.8 % — SIGNIFICANT CHANGE UP (ref 0–2)
BILIRUB SERPL-MCNC: 1.1 MG/DL — SIGNIFICANT CHANGE UP (ref 0.2–1.2)
BUN SERPL-MCNC: 20 MG/DL — SIGNIFICANT CHANGE UP (ref 7–23)
CALCIUM SERPL-MCNC: 9.1 MG/DL — SIGNIFICANT CHANGE UP (ref 8.4–10.5)
CHLORIDE SERPL-SCNC: 103 MMOL/L — SIGNIFICANT CHANGE UP (ref 96–108)
CO2 SERPL-SCNC: 30 MMOL/L — SIGNIFICANT CHANGE UP (ref 22–31)
CREAT SERPL-MCNC: 0.88 MG/DL — SIGNIFICANT CHANGE UP (ref 0.5–1.3)
EGFR: 95 ML/MIN/1.73M2 — SIGNIFICANT CHANGE UP
EOSINOPHIL # BLD AUTO: 0.25 K/UL — SIGNIFICANT CHANGE UP (ref 0–0.5)
EOSINOPHIL NFR BLD AUTO: 3.5 % — SIGNIFICANT CHANGE UP (ref 0–6)
GLUCOSE SERPL-MCNC: 101 MG/DL — HIGH (ref 70–99)
HCT VFR BLD CALC: 30.2 % — LOW (ref 39–50)
HGB BLD-MCNC: 10.1 G/DL — LOW (ref 13–17)
IMM GRANULOCYTES NFR BLD AUTO: 0.7 % — SIGNIFICANT CHANGE UP (ref 0–0.9)
LYMPHOCYTES # BLD AUTO: 1.48 K/UL — SIGNIFICANT CHANGE UP (ref 1–3.3)
LYMPHOCYTES # BLD AUTO: 20.9 % — SIGNIFICANT CHANGE UP (ref 13–44)
MCHC RBC-ENTMCNC: 30.6 PG — SIGNIFICANT CHANGE UP (ref 27–34)
MCHC RBC-ENTMCNC: 33.4 GM/DL — SIGNIFICANT CHANGE UP (ref 32–36)
MCV RBC AUTO: 91.5 FL — SIGNIFICANT CHANGE UP (ref 80–100)
MONOCYTES # BLD AUTO: 0.93 K/UL — HIGH (ref 0–0.9)
MONOCYTES NFR BLD AUTO: 13.1 % — SIGNIFICANT CHANGE UP (ref 2–14)
NEUTROPHILS # BLD AUTO: 4.32 K/UL — SIGNIFICANT CHANGE UP (ref 1.8–7.4)
NEUTROPHILS NFR BLD AUTO: 61 % — SIGNIFICANT CHANGE UP (ref 43–77)
NRBC # BLD: 0 /100 WBCS — SIGNIFICANT CHANGE UP (ref 0–0)
PLATELET # BLD AUTO: 272 K/UL — SIGNIFICANT CHANGE UP (ref 150–400)
POTASSIUM SERPL-MCNC: 4.4 MMOL/L — SIGNIFICANT CHANGE UP (ref 3.5–5.3)
POTASSIUM SERPL-SCNC: 4.4 MMOL/L — SIGNIFICANT CHANGE UP (ref 3.5–5.3)
PROT SERPL-MCNC: 6.2 G/DL — SIGNIFICANT CHANGE UP (ref 6–8.3)
RBC # BLD: 3.3 M/UL — LOW (ref 4.2–5.8)
RBC # FLD: 14.3 % — SIGNIFICANT CHANGE UP (ref 10.3–14.5)
SODIUM SERPL-SCNC: 139 MMOL/L — SIGNIFICANT CHANGE UP (ref 135–145)
WBC # BLD: 7.09 K/UL — SIGNIFICANT CHANGE UP (ref 3.8–10.5)
WBC # FLD AUTO: 7.09 K/UL — SIGNIFICANT CHANGE UP (ref 3.8–10.5)

## 2024-09-19 PROCEDURE — 99232 SBSQ HOSP IP/OBS MODERATE 35: CPT | Mod: GC

## 2024-09-19 RX ADMIN — GABAPENTIN 300 MILLIGRAM(S): 800 TABLET, FILM COATED ORAL at 15:30

## 2024-09-19 RX ADMIN — Medication 975 MILLIGRAM(S): at 17:35

## 2024-09-19 RX ADMIN — Medication 975 MILLIGRAM(S): at 11:44

## 2024-09-19 RX ADMIN — Medication 2 TABLET(S): at 21:17

## 2024-09-19 RX ADMIN — Medication 325 MILLIGRAM(S): at 05:32

## 2024-09-19 RX ADMIN — PANTOPRAZOLE SODIUM 40 MILLIGRAM(S): 40 TABLET, DELAYED RELEASE ORAL at 05:32

## 2024-09-19 RX ADMIN — GABAPENTIN 600 MILLIGRAM(S): 800 TABLET, FILM COATED ORAL at 21:17

## 2024-09-19 RX ADMIN — Medication 975 MILLIGRAM(S): at 11:43

## 2024-09-19 RX ADMIN — GEMFIBROZIL 600 MILLIGRAM(S): 600 TABLET, FILM COATED ORAL at 08:11

## 2024-09-19 RX ADMIN — Medication 975 MILLIGRAM(S): at 05:31

## 2024-09-19 RX ADMIN — Medication 975 MILLIGRAM(S): at 01:07

## 2024-09-19 RX ADMIN — Medication 17 GRAM(S): at 11:43

## 2024-09-19 RX ADMIN — Medication 325 MILLIGRAM(S): at 17:34

## 2024-09-19 RX ADMIN — Medication 1000 UNIT(S): at 11:43

## 2024-09-19 RX ADMIN — GABAPENTIN 300 MILLIGRAM(S): 800 TABLET, FILM COATED ORAL at 08:11

## 2024-09-19 NOTE — PROGRESS NOTE ADULT - ASSESSMENT
JENELLE COLEMAN is a 65 yo male with PMHx of high cholesterol, primary OA of b/l knees,  and diverticulitis s/p colectomy who presented to [facility] for elective bilateral total knee replacement. Patient had B/L TKR on 9/10/2024 with Dr. Thierno Tan. Hospital course unremarkable and without complications. Now admitted to [facility] after for initiation of a multidisciplinary rehab program consisting focused on functional mobility, transfers and ADLs.    #Bilateral Total Knee Replacement  - B/L TKR on 9/10 with Dr. Thierno Tan  - Continue multimodal pain control with Tylenol 975 QID, and Gabapentin 600 mg HS  - Oxycodone 5-10mg PRN    - Flexeril 5mg TID PRN  -  mg BID for VTE ppx s/p B/L TKR x 42 days (end date 10/22)  - Fall Precautions   - Comprehensive Multidisciplinary Rehab Program: Gait, ADL, Functional impairments; PT/OT/ SLP 3 hours a day 5 days a week  -Vit D 1000U daily  -Remove mepilex dressing on 9/17 (POD#7)- done    #HLD  -Lopid 600mg BID     #Sleep:  - Maintain quiet hours and low stim environment  - Melatonin 6mg PRN    #GI/Bowel:  - PRN: Miralax 17 GM BID, Senna 2 tabs daily- last BM 9/16  - GI ppx: Protonix 40 mg daily    #/Bladder:  voiding well    #Diet / Dysphagia:    - Diet: Regular/TLC    #Skin/ Pressure Injury Prevention:  - assessment on admission:  LUE bruising, BLE ecchymosis, mepilex BL knees DCd, CDI, L buttock scab       #DVT prophylaxis:  -   BID x 42 days (end date 10/22)  - TEDs    #Precautions/ Restrictions  - Falls  - Ortho:      Weight bearing status: BLE WBAT

## 2024-09-20 PROCEDURE — 99232 SBSQ HOSP IP/OBS MODERATE 35: CPT | Mod: GC

## 2024-09-20 PROCEDURE — 99232 SBSQ HOSP IP/OBS MODERATE 35: CPT

## 2024-09-20 RX ADMIN — Medication 17 GRAM(S): at 17:53

## 2024-09-20 RX ADMIN — Medication 975 MILLIGRAM(S): at 12:15

## 2024-09-20 RX ADMIN — Medication 975 MILLIGRAM(S): at 21:55

## 2024-09-20 RX ADMIN — Medication 975 MILLIGRAM(S): at 00:49

## 2024-09-20 RX ADMIN — Medication 325 MILLIGRAM(S): at 05:18

## 2024-09-20 RX ADMIN — Medication 975 MILLIGRAM(S): at 22:40

## 2024-09-20 RX ADMIN — Medication 975 MILLIGRAM(S): at 11:27

## 2024-09-20 RX ADMIN — Medication 975 MILLIGRAM(S): at 17:52

## 2024-09-20 RX ADMIN — PANTOPRAZOLE SODIUM 40 MILLIGRAM(S): 40 TABLET, DELAYED RELEASE ORAL at 05:18

## 2024-09-20 RX ADMIN — GEMFIBROZIL 600 MILLIGRAM(S): 600 TABLET, FILM COATED ORAL at 08:32

## 2024-09-20 RX ADMIN — Medication 325 MILLIGRAM(S): at 17:51

## 2024-09-20 RX ADMIN — GABAPENTIN 600 MILLIGRAM(S): 800 TABLET, FILM COATED ORAL at 21:54

## 2024-09-20 RX ADMIN — Medication 975 MILLIGRAM(S): at 01:40

## 2024-09-20 RX ADMIN — Medication 975 MILLIGRAM(S): at 05:17

## 2024-09-20 RX ADMIN — Medication 17 GRAM(S): at 11:28

## 2024-09-20 RX ADMIN — GABAPENTIN 300 MILLIGRAM(S): 800 TABLET, FILM COATED ORAL at 08:22

## 2024-09-20 RX ADMIN — Medication 1000 UNIT(S): at 11:27

## 2024-09-20 RX ADMIN — GABAPENTIN 300 MILLIGRAM(S): 800 TABLET, FILM COATED ORAL at 14:59

## 2024-09-20 NOTE — PROGRESS NOTE ADULT - ASSESSMENT
JENELLE COLEMAN is a 65 yo male with PMHx of hypertriglyceridemia, primary OA of b/l knees, h/o diverticulitis s/p colectomy who presented to [facility] for elective bilateral total knee replacement. Patient had B/L TKR on 9/10/2024 with Dr. Thierno Tan. Hospital course unremarkable and without complications. Admitted to [facility] for initiation of a multidisciplinary rehab program consisting focused on functional mobility, transfers and ADLs.    #Bilateral Total Knee Replacement  - B/L TKR on 9/10 with Dr. Thierno Tan  - Continue  with Tylenol 975 QID, and Gabapentin 600 mg   - Oxycodone 5-10mg PRN    - Flexeril 5mg TID PRN  -  mg BID for VTE ppx s/p B/L TKR x 42 days (end date 10/22)  - Fall Precautions   - Comprehensive Multidisciplinary Rehab Program: Gait, ADL, Functional impairments  - Vit D 1000U daily    #HLD  -continue Lopid 600mg BID     #DVT prophylaxis:  -   BID x 42 days (end date 10/22)  - TEDs

## 2024-09-20 NOTE — PROGRESS NOTE ADULT - ASSESSMENT
JENELLE COLEMAN is a 65 yo male with PMHx of high cholesterol, primary OA of b/l knees,  and diverticulitis s/p colectomy who presented to [facility] for elective bilateral total knee replacement. Patient had B/L TKR on 9/10/2024 with Dr. Thierno Tan. Hospital course unremarkable and without complications. Now admitted to [facility] after for initiation of a multidisciplinary rehab program consisting focused on functional mobility, transfers and ADLs.    #Bilateral Total Knee Replacement  - B/L TKR on 9/10 with Dr. Thierno Tan  - Continue multimodal pain control with Tylenol 975 QID, and Gabapentin 600 mg HS  - Oxycodone 5-10mg PRN    - Flexeril 5mg TID PRN  -  mg BID for VTE ppx s/p B/L TKR x 42 days (end date 10/22)  - Fall Precautions   - Comprehensive Multidisciplinary Rehab Program: Gait, ADL, Functional impairments; PT/OT/ SLP 3 hours a day 5 days a week  -Vit D 1000U daily  -Remove mepilex dressing on 9/17 (POD#7)- done    #HLD  -Lopid 600mg BID     #Sleep:  - Maintain quiet hours and low stim environment  - Melatonin 6mg PRN    #GI/Bowel:  - PRN: Miralax 17 GM BID, Senna 2 tabs daily- last BM 9/19  - GI ppx: Protonix 40 mg daily    #/Bladder:  voiding well    #Diet / Dysphagia:    - Diet: Regular/TLC    #Skin/ Pressure Injury Prevention:  - assessment on admission:  LUE bruising, BLE ecchymosis, mepilex BL knees DCd, CDI, L buttock scab       #DVT prophylaxis:  -   BID x 42 days (end date 10/22)  - TEDs    #Precautions/ Restrictions  - Falls  - Ortho:      Weight bearing status: BLE WBAT

## 2024-09-21 PROCEDURE — 99232 SBSQ HOSP IP/OBS MODERATE 35: CPT

## 2024-09-21 PROCEDURE — 99232 SBSQ HOSP IP/OBS MODERATE 35: CPT | Mod: GC

## 2024-09-21 RX ADMIN — GABAPENTIN 600 MILLIGRAM(S): 800 TABLET, FILM COATED ORAL at 21:10

## 2024-09-21 RX ADMIN — PANTOPRAZOLE SODIUM 40 MILLIGRAM(S): 40 TABLET, DELAYED RELEASE ORAL at 05:24

## 2024-09-21 RX ADMIN — Medication 975 MILLIGRAM(S): at 18:21

## 2024-09-21 RX ADMIN — Medication 325 MILLIGRAM(S): at 05:24

## 2024-09-21 RX ADMIN — GEMFIBROZIL 600 MILLIGRAM(S): 600 TABLET, FILM COATED ORAL at 08:30

## 2024-09-21 RX ADMIN — Medication 975 MILLIGRAM(S): at 06:08

## 2024-09-21 RX ADMIN — Medication 975 MILLIGRAM(S): at 05:24

## 2024-09-21 RX ADMIN — Medication 975 MILLIGRAM(S): at 12:30

## 2024-09-21 RX ADMIN — Medication 325 MILLIGRAM(S): at 17:56

## 2024-09-21 RX ADMIN — Medication 975 MILLIGRAM(S): at 11:54

## 2024-09-21 RX ADMIN — Medication 1000 UNIT(S): at 11:53

## 2024-09-21 RX ADMIN — Medication 17 GRAM(S): at 11:55

## 2024-09-21 RX ADMIN — GABAPENTIN 300 MILLIGRAM(S): 800 TABLET, FILM COATED ORAL at 14:57

## 2024-09-21 RX ADMIN — GABAPENTIN 300 MILLIGRAM(S): 800 TABLET, FILM COATED ORAL at 08:30

## 2024-09-21 RX ADMIN — Medication 975 MILLIGRAM(S): at 17:57

## 2024-09-21 NOTE — PROGRESS NOTE ADULT - ASSESSMENT
JENELLE COLEMAN is a 63 yo male with PMHx of high cholesterol, primary OA of b/l knees,  and diverticulitis s/p colectomy who presented to [facility] for elective bilateral total knee replacement. Patient had B/L TKR on 9/10/2024 with Dr. Thierno Tan. Hospital course unremarkable and without complications. Now admitted to [facility] after for initiation of a multidisciplinary rehab program consisting focused on functional mobility, transfers and ADLs.    #Bilateral Total Knee Replacement  - B/L TKR on 9/10 with Dr. Thierno Tan  - Continue multimodal pain control with Tylenol 975 QID, and Gabapentin 600 mg HS  - Oxycodone 5-10mg PRN    - Flexeril 5mg TID PRN  -  mg BID for VTE ppx s/p B/L TKR x 42 days (end date 10/22)  - Fall Precautions   - Comprehensive Multidisciplinary Rehab Program: Gait, ADL, Functional impairments; PT/OT/ SLP 3 hours a day 5 days a week  -Vit D 1000U daily  -Remove mepilex dressing on 9/17 (POD#7)- done- ? DC staples 9/24    #HLD  -Lopid 600mg BID     #Sleep:  - Maintain quiet hours and low stim environment  - Melatonin 6mg PRN    #GI/Bowel:  - PRN: Miralax 17 GM BID, Senna 2 tabs daily- last BM 9/20  - GI ppx: Protonix 40 mg daily    #/Bladder:  voiding well    #Diet / Dysphagia:    - Diet: Regular/TLC    #Skin/ Pressure Injury Prevention:  - assessment on admission:  LUE bruising, BLE ecchymosis, mepilex BL knees DCd, CDI, L buttock scab       #DVT prophylaxis:  -   BID x 42 days (end date 10/22)  - TEDs    #Precautions/ Restrictions  - Falls  - Ortho:      Weight bearing status: BLE WBAT

## 2024-09-21 NOTE — PROGRESS NOTE ADULT - ASSESSMENT
JENELLE COLEMAN is a 63 yo male with PMHx of hypertriglyceridemia, primary OA of b/l knees, h/o diverticulitis s/p colectomy who presented to [facility] for elective bilateral total knee replacement. Patient had B/L TKR on 9/10/2024 with Dr. Thierno Tan. Hospital course unremarkable and without complications. Admitted to [facility] for initiation of a multidisciplinary rehab program consisting focused on functional mobility, transfers and ADLs.    #Bilateral Total Knee Replacement  - B/L TKR on 9/10 with Dr. Thierno Tan  - Pain regimen per rehab  -  mg BID for VTE ppx s/p B/L TKR x 42 days (end date 10/22)  - Vit D 1000U daily  - Fall Precautions   - Comprehensive Multidisciplinary Rehab Program    #HLD  -continue Lopid 600mg BID     #Anemia  -stable currently  -check iron/b12/folate in AM    #GI PPX: protonix 40    #DVT prophylaxis:  -   BID x 42 days (end date 10/22)  - Brianne

## 2024-09-22 LAB
FERRITIN SERPL-MCNC: 282 NG/ML — SIGNIFICANT CHANGE UP (ref 30–400)
FOLATE SERPL-MCNC: 6.7 NG/ML — SIGNIFICANT CHANGE UP
IRON SATN MFR SERPL: 15 % — LOW (ref 16–55)
IRON SATN MFR SERPL: 55 UG/DL — SIGNIFICANT CHANGE UP (ref 45–165)
TIBC SERPL-MCNC: 355 UG/DL — SIGNIFICANT CHANGE UP (ref 220–430)
UIBC SERPL-MCNC: 301 UG/DL — SIGNIFICANT CHANGE UP (ref 110–370)
VIT B12 SERPL-MCNC: 484 PG/ML — SIGNIFICANT CHANGE UP (ref 232–1245)

## 2024-09-22 PROCEDURE — 99232 SBSQ HOSP IP/OBS MODERATE 35: CPT | Mod: GC

## 2024-09-22 PROCEDURE — 99232 SBSQ HOSP IP/OBS MODERATE 35: CPT

## 2024-09-22 RX ORDER — FOLIC ACID 1 MG/1
1 TABLET ORAL DAILY
Refills: 0 | Status: DISCONTINUED | OUTPATIENT
Start: 2024-09-22 | End: 2024-09-27

## 2024-09-22 RX ORDER — CYANOCOBALAMIN (VITAMIN B-12) 1000MCG/ML
1000 VIAL (ML) INJECTION
Refills: 0 | Status: DISCONTINUED | OUTPATIENT
Start: 2024-09-22 | End: 2024-09-27

## 2024-09-22 RX ADMIN — Medication 975 MILLIGRAM(S): at 12:12

## 2024-09-22 RX ADMIN — PANTOPRAZOLE SODIUM 40 MILLIGRAM(S): 40 TABLET, DELAYED RELEASE ORAL at 05:31

## 2024-09-22 RX ADMIN — GABAPENTIN 600 MILLIGRAM(S): 800 TABLET, FILM COATED ORAL at 21:33

## 2024-09-22 RX ADMIN — GEMFIBROZIL 600 MILLIGRAM(S): 600 TABLET, FILM COATED ORAL at 08:08

## 2024-09-22 RX ADMIN — Medication 975 MILLIGRAM(S): at 17:26

## 2024-09-22 RX ADMIN — Medication 975 MILLIGRAM(S): at 17:59

## 2024-09-22 RX ADMIN — Medication 975 MILLIGRAM(S): at 06:10

## 2024-09-22 RX ADMIN — Medication 17 GRAM(S): at 17:26

## 2024-09-22 RX ADMIN — Medication 975 MILLIGRAM(S): at 12:40

## 2024-09-22 RX ADMIN — Medication 975 MILLIGRAM(S): at 05:31

## 2024-09-22 RX ADMIN — Medication 325 MILLIGRAM(S): at 05:31

## 2024-09-22 RX ADMIN — GABAPENTIN 300 MILLIGRAM(S): 800 TABLET, FILM COATED ORAL at 08:08

## 2024-09-22 RX ADMIN — FOLIC ACID 1 MILLIGRAM(S): 1 TABLET ORAL at 12:12

## 2024-09-22 RX ADMIN — Medication 325 MILLIGRAM(S): at 17:26

## 2024-09-22 RX ADMIN — Medication 1000 UNIT(S): at 12:12

## 2024-09-22 RX ADMIN — GABAPENTIN 300 MILLIGRAM(S): 800 TABLET, FILM COATED ORAL at 14:16

## 2024-09-22 RX ADMIN — Medication 1000 MICROGRAM(S): at 12:12

## 2024-09-22 NOTE — PROGRESS NOTE ADULT - ASSESSMENT
JENELLE COLEMAN is a 65 yo male with PMHx of high cholesterol, primary OA of b/l knees,  and diverticulitis s/p colectomy who presented to [facility] for elective bilateral total knee replacement. Patient had B/L TKR on 9/10/2024 with Dr. Thierno Tan. Hospital course unremarkable and without complications. Now admitted to [facility] after for initiation of a multidisciplinary rehab program consisting focused on functional mobility, transfers and ADLs.    #Bilateral Total Knee Replacement  - B/L TKR on 9/10 with Dr. Thierno Tan  - Continue multimodal pain control with Tylenol 975 QID, and Gabapentin 600 mg HS  - Oxycodone 5-10mg PRN    - Flexeril 5mg TID PRN  -  mg BID for VTE ppx s/p B/L TKR x 42 days (end date 10/22)  - Fall Precautions   - Comprehensive Multidisciplinary Rehab Program: Gait, ADL, Functional impairments; PT/OT/ SLP 3 hours a day 5 days a week  -Vit D 1000U daily  -Remove mepilex dressing on 9/17 (POD#7)- done- ? DC staples 9/24    #HLD  -Lopid 600mg BID     #Sleep:  - Maintain quiet hours and low stim environment  - Melatonin 6mg PRN    #GI/Bowel:  - PRN: Miralax 17 GM BID, Senna 2 tabs daily- last BM 9/20  - GI ppx: Protonix 40 mg daily    #/Bladder:  voiding well    #Diet / Dysphagia:    - Diet: Regular/TLC    #Skin/ Pressure Injury Prevention:  - assessment on admission:  LUE bruising, BLE ecchymosis, mepilex BL knees DCd, CDI, L buttock scab       #DVT prophylaxis:  -   BID x 42 days (end date 10/22)  - TEDs    #Precautions/ Restrictions  - Falls  - Ortho:      Weight bearing status: BLE WBAT

## 2024-09-22 NOTE — PROGRESS NOTE ADULT - ASSESSMENT
63 yo male with PMHx of hypertriglyceridemia, primary OA of b/l knees, h/o diverticulitis s/p colectomy who [facility]. Patient had B/L TKR on 9/10/2024 with Dr. Thierno Tan. Hospital course unremarkable and without complications. Admitted to [facility] for initiation of a multidisciplinary rehab program consisting focused on functional mobility, transfers and ADLs.    #Bilateral Total Knee Replacement  - B/L TKR on 9/10 with Dr. Thierno Tan  - Pain regimen per rehab  -  mg BID for VTE ppx s/p B/L TKR x 42 days (end date 10/22)  - Vit D 1000U daily  - Fall Precautions   - Comprehensive Multidisciplinary Rehab Program    #HLD  -continue Lopid 600mg BID     #Anemia  -stable currently  -low iron saturation but normal ferritin and TIBC  -low-normal ferritin and B12, will start folic acid and b12 PO    #GI PPX: protonix 40    #DVT prophylaxis:  -   BID x 42 days (end date 10/22)  - TEDs

## 2024-09-23 LAB
ALBUMIN SERPL ELPH-MCNC: 3.3 G/DL — SIGNIFICANT CHANGE UP (ref 3.3–5)
ALP SERPL-CCNC: 83 U/L — SIGNIFICANT CHANGE UP (ref 40–120)
ALT FLD-CCNC: 36 U/L — SIGNIFICANT CHANGE UP (ref 10–45)
ANION GAP SERPL CALC-SCNC: 7 MMOL/L — SIGNIFICANT CHANGE UP (ref 5–17)
AST SERPL-CCNC: 26 U/L — SIGNIFICANT CHANGE UP (ref 10–40)
BASOPHILS # BLD AUTO: 0.07 K/UL — SIGNIFICANT CHANGE UP (ref 0–0.2)
BASOPHILS NFR BLD AUTO: 1.1 % — SIGNIFICANT CHANGE UP (ref 0–2)
BILIRUB SERPL-MCNC: 0.8 MG/DL — SIGNIFICANT CHANGE UP (ref 0.2–1.2)
BUN SERPL-MCNC: 22 MG/DL — SIGNIFICANT CHANGE UP (ref 7–23)
CALCIUM SERPL-MCNC: 9.2 MG/DL — SIGNIFICANT CHANGE UP (ref 8.4–10.5)
CHLORIDE SERPL-SCNC: 101 MMOL/L — SIGNIFICANT CHANGE UP (ref 96–108)
CO2 SERPL-SCNC: 29 MMOL/L — SIGNIFICANT CHANGE UP (ref 22–31)
CREAT SERPL-MCNC: 1 MG/DL — SIGNIFICANT CHANGE UP (ref 0.5–1.3)
EGFR: 83 ML/MIN/1.73M2 — SIGNIFICANT CHANGE UP
EOSINOPHIL # BLD AUTO: 0.25 K/UL — SIGNIFICANT CHANGE UP (ref 0–0.5)
EOSINOPHIL NFR BLD AUTO: 3.8 % — SIGNIFICANT CHANGE UP (ref 0–6)
GLUCOSE SERPL-MCNC: 97 MG/DL — SIGNIFICANT CHANGE UP (ref 70–99)
HCT VFR BLD CALC: 32.2 % — LOW (ref 39–50)
HGB BLD-MCNC: 10.2 G/DL — LOW (ref 13–17)
IMM GRANULOCYTES NFR BLD AUTO: 1.2 % — HIGH (ref 0–0.9)
LYMPHOCYTES # BLD AUTO: 1.69 K/UL — SIGNIFICANT CHANGE UP (ref 1–3.3)
LYMPHOCYTES # BLD AUTO: 25.8 % — SIGNIFICANT CHANGE UP (ref 13–44)
MCHC RBC-ENTMCNC: 29.7 PG — SIGNIFICANT CHANGE UP (ref 27–34)
MCHC RBC-ENTMCNC: 31.7 GM/DL — LOW (ref 32–36)
MCV RBC AUTO: 93.9 FL — SIGNIFICANT CHANGE UP (ref 80–100)
MONOCYTES # BLD AUTO: 0.72 K/UL — SIGNIFICANT CHANGE UP (ref 0–0.9)
MONOCYTES NFR BLD AUTO: 11 % — SIGNIFICANT CHANGE UP (ref 2–14)
NEUTROPHILS # BLD AUTO: 3.74 K/UL — SIGNIFICANT CHANGE UP (ref 1.8–7.4)
NEUTROPHILS NFR BLD AUTO: 57.1 % — SIGNIFICANT CHANGE UP (ref 43–77)
NRBC # BLD: 0 /100 WBCS — SIGNIFICANT CHANGE UP (ref 0–0)
PLATELET # BLD AUTO: 306 K/UL — SIGNIFICANT CHANGE UP (ref 150–400)
POTASSIUM SERPL-MCNC: 4.4 MMOL/L — SIGNIFICANT CHANGE UP (ref 3.5–5.3)
POTASSIUM SERPL-SCNC: 4.4 MMOL/L — SIGNIFICANT CHANGE UP (ref 3.5–5.3)
PROT SERPL-MCNC: 6.3 G/DL — SIGNIFICANT CHANGE UP (ref 6–8.3)
RBC # BLD: 3.43 M/UL — LOW (ref 4.2–5.8)
RBC # FLD: 15.4 % — HIGH (ref 10.3–14.5)
SODIUM SERPL-SCNC: 137 MMOL/L — SIGNIFICANT CHANGE UP (ref 135–145)
WBC # BLD: 6.55 K/UL — SIGNIFICANT CHANGE UP (ref 3.8–10.5)
WBC # FLD AUTO: 6.55 K/UL — SIGNIFICANT CHANGE UP (ref 3.8–10.5)

## 2024-09-23 PROCEDURE — 99232 SBSQ HOSP IP/OBS MODERATE 35: CPT

## 2024-09-23 PROCEDURE — 99232 SBSQ HOSP IP/OBS MODERATE 35: CPT | Mod: GC

## 2024-09-23 RX ADMIN — GEMFIBROZIL 600 MILLIGRAM(S): 600 TABLET, FILM COATED ORAL at 08:34

## 2024-09-23 RX ADMIN — Medication 325 MILLIGRAM(S): at 05:33

## 2024-09-23 RX ADMIN — Medication 975 MILLIGRAM(S): at 12:16

## 2024-09-23 RX ADMIN — Medication 17 GRAM(S): at 17:59

## 2024-09-23 RX ADMIN — GABAPENTIN 600 MILLIGRAM(S): 800 TABLET, FILM COATED ORAL at 21:31

## 2024-09-23 RX ADMIN — Medication 975 MILLIGRAM(S): at 00:13

## 2024-09-23 RX ADMIN — Medication 975 MILLIGRAM(S): at 18:55

## 2024-09-23 RX ADMIN — PANTOPRAZOLE SODIUM 40 MILLIGRAM(S): 40 TABLET, DELAYED RELEASE ORAL at 05:33

## 2024-09-23 RX ADMIN — Medication 975 MILLIGRAM(S): at 13:16

## 2024-09-23 RX ADMIN — Medication 975 MILLIGRAM(S): at 05:34

## 2024-09-23 RX ADMIN — Medication 1000 MICROGRAM(S): at 12:17

## 2024-09-23 RX ADMIN — GABAPENTIN 300 MILLIGRAM(S): 800 TABLET, FILM COATED ORAL at 08:34

## 2024-09-23 RX ADMIN — Medication 325 MILLIGRAM(S): at 17:55

## 2024-09-23 RX ADMIN — FOLIC ACID 1 MILLIGRAM(S): 1 TABLET ORAL at 12:17

## 2024-09-23 RX ADMIN — GABAPENTIN 300 MILLIGRAM(S): 800 TABLET, FILM COATED ORAL at 14:45

## 2024-09-23 RX ADMIN — Medication 1000 UNIT(S): at 12:16

## 2024-09-23 RX ADMIN — Medication 975 MILLIGRAM(S): at 17:55

## 2024-09-23 RX ADMIN — Medication 975 MILLIGRAM(S): at 01:57

## 2024-09-23 RX ADMIN — Medication 975 MILLIGRAM(S): at 06:49

## 2024-09-23 NOTE — PROGRESS NOTE ADULT - ASSESSMENT
63 yo male with PMHx of hypertriglyceridemia, primary OA of b/l knees, h/o diverticulitis s/p colectomy who [facility] to [facility] bilateral total knee replacement. Patient had B/L TKR on 9/10/2024 with Dr. Thierno Tan. Hospital course unremarkable and without complications. Admitted to [facility] for initiation of a multidisciplinary rehab program consisting focused on functional mobility, transfers and ADLs.    #Bilateral Total Knee Replacement  - B/L TKR on 9/10 with Dr. Thierno Tan  - Pain regimen per rehab  -  mg BID for VTE ppx s/p B/L TKR x 42 days (end date 10/22)  - Vit D 1000U daily  - Fall Precautions   - Comprehensive Multidisciplinary Rehab Program    #HLD  -continue Lopid 600mg BID     #Anemia  -stable currently  -low iron saturation but normal ferritin and TIBC  -low-normal ferritin and B12, started on folic acid and b12 PO    #GI PPX: protonix 40    #DVT prophylaxis:  -   BID x 42 days (end date 10/22)  - TEDs

## 2024-09-23 NOTE — PROGRESS NOTE ADULT - ASSESSMENT
JENELLE COLEMAN is a 63 yo male with PMHx of high cholesterol, primary OA of b/l knees,  and diverticulitis s/p colectomy who presented to [facility] for elective bilateral total knee replacement. Patient had B/L TKR on 9/10/2024 with Dr. Thierno Tan. Hospital course unremarkable and without complications. Now admitted to [facility] after for initiation of a multidisciplinary rehab program consisting focused on functional mobility, transfers and ADLs.    #Bilateral Total Knee Replacement  - B/L TKR on 9/10 with Dr. Thierno Tan  - Continue multimodal pain control with Tylenol 975 QID, and Gabapentin 600 mg HS  - Oxycodone 5-10mg PRN    - Flexeril 5mg TID PRN  -  mg BID for VTE ppx s/p B/L TKR x 42 days (end date 10/22)  - Fall Precautions   - Comprehensive Multidisciplinary Rehab Program: Gait, ADL, Functional impairments; PT/OT/ SLP 3 hours a day 5 days a week  -Vit D 1000U daily  -Remove mepilex dressing on 9/17 (POD#7)- done-  will contact orthopedist regarding Dc staples    #HLD  -Lopid 600mg BID     #Sleep:  - Maintain quiet hours and low stim environment  - Melatonin 6mg PRN    #GI/Bowel:  - PRN: Miralax 17 GM BID, Senna 2 tabs daily- last BM 9/20  - GI ppx: Protonix 40 mg daily    #/Bladder:  voiding well    #Diet / Dysphagia:    - Diet: Regular/TLC    #Skin/ Pressure Injury Prevention:  - assessment on admission:  LUE bruising, BLE ecchymosis, mepilex BL knees DCd, CDI, L buttock scab       #DVT prophylaxis:  -   BID x 42 days (end date 10/22)  - TEDs    #Precautions/ Restrictions  - Falls  - Ortho:      Weight bearing status: BLE WBAT

## 2024-09-23 NOTE — CHART NOTE - NSCHARTNOTEFT_GEN_A_CORE
Nutrition Follow Up Note  Hospital Course   (Per Electronic Medical Record)    Source:  Patient [X]  Medical Record [X]      Diet:   Regular Diet (IDDSI Level 7) w/ Thin Liquids (IDDSI Level 0)  Tolerates Diet Consistency Well  No Chewing/Swallowing Difficulties  No Recent Nausea, Vomiting, Diarrhea or Constipation (as Per Patient)  Consumes % of Meals (as Per Documentation) - Good PO Intake/Appetite (Per Patient)   Obtained Food Preferences from Patient    Enteral/Parenteral Nutrition: Not Applicable    Current Weight: 236.3lb on 9/23  Obtain New Weight  Obtain Weights Weekly     Pertinent Medications: MEDICATIONS  (STANDING):  acetaminophen     Tablet .. 975 milliGRAM(s) Oral every 6 hours  aspirin 325 milliGRAM(s) Oral two times a day  cholecalciferol 1000 Unit(s) Oral daily  cyanocobalamin 1000 MICROGram(s) Oral with breakfast  folic acid 1 milliGRAM(s) Oral daily  gabapentin 600 milliGRAM(s) Oral at bedtime  gabapentin 300 milliGRAM(s) Oral <User Schedule>  gemfibrozil 600 milliGRAM(s) Oral <User Schedule>  pantoprazole    Tablet 40 milliGRAM(s) Oral before breakfast  polyethylene glycol 3350 17 Gram(s) Oral two times a day    MEDICATIONS  (PRN):  cyclobenzaprine 5 milliGRAM(s) Oral three times a day PRN Muscle Spasm  oxyCODONE    IR 10 milliGRAM(s) Oral every 6 hours PRN Severe Pain (7 - 10)  oxyCODONE    IR 5 milliGRAM(s) Oral every 6 hours PRN Moderate Pain (4 - 6)  senna 2 Tablet(s) Oral at bedtime PRN Constipation    Pertinent Labs:  09-23 Na137 mmol/L Glu 97 mg/dL K+ 4.4 mmol/L Cr  1.00 mg/dL BUN 22 mg/dL 09-23 Alb 3.3 g/dL    Skin: No Pressure Ulcers  Multiple Surgical Incisions  (as Per Nursing Flow Sheet)     Edema: None Noted (as Per Documentation)     Last Bowel Movement: on 9/22    Estimated Needs:   [X] No Change Since Previous Assessment    Previous Nutrition Diagnosis:   Obese    Nutrition Diagnosis is [X] Ongoing - Continue Nutrition Plan of Care     New Nutrition Diagnosis: [X] Not Applicable    Interventions:   1. Recommend Continue Nutrition Plan of Care     Monitoring & Evaluation:   [X] Weights   [X] PO Intake   [X] Skin Integrity   [X] Follow Up (Per Protocol)  [X] Tolerance to Diet Prescription   [X] Other: Labs     Registered Dietitian/Nutritionist Remains Available.  Curt Simmons, SHARIN, CDN    Phone# [phone]

## 2024-09-24 ENCOUNTER — TRANSCRIPTION ENCOUNTER (OUTPATIENT)
Age: 64
End: 2024-09-24

## 2024-09-24 PROCEDURE — 99232 SBSQ HOSP IP/OBS MODERATE 35: CPT | Mod: GC

## 2024-09-24 PROCEDURE — 99232 SBSQ HOSP IP/OBS MODERATE 35: CPT

## 2024-09-24 PROCEDURE — 73562 X-RAY EXAM OF KNEE 3: CPT | Mod: 26,LT

## 2024-09-24 RX ORDER — GABAPENTIN 800 MG/1
1 TABLET, FILM COATED ORAL
Qty: 0 | Refills: 0 | DISCHARGE
Start: 2024-09-24

## 2024-09-24 RX ORDER — FOLIC ACID 1 MG/1
1 TABLET ORAL
Qty: 0 | Refills: 0 | DISCHARGE
Start: 2024-09-24

## 2024-09-24 RX ORDER — GABAPENTIN 800 MG/1
2 TABLET, FILM COATED ORAL
Qty: 0 | Refills: 0 | DISCHARGE
Start: 2024-09-24

## 2024-09-24 RX ORDER — LIDOCAINE 50 MG/G
1 CREAM TOPICAL DAILY
Refills: 0 | Status: DISCONTINUED | OUTPATIENT
Start: 2024-09-24 | End: 2024-09-27

## 2024-09-24 RX ORDER — ACETAMINOPHEN 325 MG
3 TABLET ORAL
Qty: 0 | Refills: 0 | DISCHARGE
Start: 2024-09-24

## 2024-09-24 RX ORDER — CRANBERRY FRUIT EXTRACT 650 MG
1000 CAPSULE ORAL
Qty: 0 | Refills: 0 | DISCHARGE
Start: 2024-09-24

## 2024-09-24 RX ORDER — SENNOSIDES 8.6 MG
2 TABLET ORAL
Qty: 0 | Refills: 0 | DISCHARGE
Start: 2024-09-24

## 2024-09-24 RX ORDER — GEMFIBROZIL 600 MG/1
1 TABLET, FILM COATED ORAL
Qty: 0 | Refills: 0 | DISCHARGE
Start: 2024-09-24

## 2024-09-24 RX ORDER — ASPIRIN 325 MG
1 TABLET ORAL
Qty: 0 | Refills: 0 | DISCHARGE
Start: 2024-09-24

## 2024-09-24 RX ORDER — CYANOCOBALAMIN (VITAMIN B-12) 1000MCG/ML
1 VIAL (ML) INJECTION
Qty: 0 | Refills: 0 | DISCHARGE
Start: 2024-09-24

## 2024-09-24 RX ORDER — PANTOPRAZOLE SODIUM 40 MG/1
1 TABLET, DELAYED RELEASE ORAL
Qty: 0 | Refills: 0 | DISCHARGE
Start: 2024-09-24

## 2024-09-24 RX ADMIN — GEMFIBROZIL 600 MILLIGRAM(S): 600 TABLET, FILM COATED ORAL at 08:16

## 2024-09-24 RX ADMIN — Medication 975 MILLIGRAM(S): at 12:20

## 2024-09-24 RX ADMIN — Medication 975 MILLIGRAM(S): at 19:56

## 2024-09-24 RX ADMIN — Medication 325 MILLIGRAM(S): at 18:51

## 2024-09-24 RX ADMIN — Medication 975 MILLIGRAM(S): at 18:50

## 2024-09-24 RX ADMIN — Medication 975 MILLIGRAM(S): at 00:10

## 2024-09-24 RX ADMIN — Medication 975 MILLIGRAM(S): at 05:45

## 2024-09-24 RX ADMIN — LIDOCAINE 1 PATCH: 50 CREAM TOPICAL at 19:46

## 2024-09-24 RX ADMIN — Medication 17 GRAM(S): at 18:53

## 2024-09-24 RX ADMIN — Medication 975 MILLIGRAM(S): at 06:45

## 2024-09-24 RX ADMIN — LIDOCAINE 1 PATCH: 50 CREAM TOPICAL at 12:20

## 2024-09-24 RX ADMIN — Medication 1000 MICROGRAM(S): at 08:16

## 2024-09-24 RX ADMIN — GABAPENTIN 300 MILLIGRAM(S): 800 TABLET, FILM COATED ORAL at 13:03

## 2024-09-24 RX ADMIN — Medication 975 MILLIGRAM(S): at 13:20

## 2024-09-24 RX ADMIN — GABAPENTIN 300 MILLIGRAM(S): 800 TABLET, FILM COATED ORAL at 08:16

## 2024-09-24 RX ADMIN — FOLIC ACID 1 MILLIGRAM(S): 1 TABLET ORAL at 12:20

## 2024-09-24 RX ADMIN — Medication 1000 UNIT(S): at 12:20

## 2024-09-24 RX ADMIN — Medication 325 MILLIGRAM(S): at 05:45

## 2024-09-24 RX ADMIN — GABAPENTIN 600 MILLIGRAM(S): 800 TABLET, FILM COATED ORAL at 22:54

## 2024-09-24 RX ADMIN — PANTOPRAZOLE SODIUM 40 MILLIGRAM(S): 40 TABLET, DELAYED RELEASE ORAL at 05:45

## 2024-09-24 NOTE — DISCHARGE NOTE PROVIDER - DISCHARGE SERVICE FOR PATIENT
on the discharge service for the patient. I have reviewed and made amendments to the documentation where necessary.
Abnormal Fetal Heart Rate Category II

## 2024-09-24 NOTE — DISCHARGE NOTE PROVIDER - NSDCCPCAREPLAN_GEN_ALL_CORE_FT
PRINCIPAL DISCHARGE DIAGNOSIS  Diagnosis: H/O total knee replacement, bilateral  Assessment and Plan of Treatment:       SECONDARY DISCHARGE DIAGNOSES  Diagnosis: Pain in back  Assessment and Plan of Treatment:      PRINCIPAL DISCHARGE DIAGNOSIS  Diagnosis: H/O total knee replacement, bilateral  Assessment and Plan of Treatment: Follow with orthopedics 2 weeks - continue Aspirin 2x/day for six weeks after surgery- finish 10/22      SECONDARY DISCHARGE DIAGNOSES  Diagnosis: Pain in back  Assessment and Plan of Treatment: continue gabapentin- follow with PCP

## 2024-09-24 NOTE — PROGRESS NOTE ADULT - ASSESSMENT
JENELLE COLEMAN is a 63 yo male with PMHx of high cholesterol, primary OA of b/l knees,  and diverticulitis s/p colectomy who presented to [facility] for elective bilateral total knee replacement. Patient had B/L TKR on 9/10/2024 with Dr. Thierno Tan. Hospital course unremarkable and without complications. Now admitted to [facility] after for initiation of a multidisciplinary rehab program consisting focused on functional mobility, transfers and ADLs.    #Bilateral Total Knee Replacement  - B/L TKR on 9/10 with Dr. Thierno Tan  - Continue multimodal pain control with Tylenol 975 QID, and Gabapentin 600 mg HS  - Oxycodone 5-10mg PRN    - Flexeril 5mg TID PRN  -  mg BID for VTE ppx s/p B/L TKR x 42 days (end date 10/22)  - Fall Precautions   - Comprehensive Multidisciplinary Rehab Program: Gait, ADL, Functional impairments; PT/OT/ SLP 3 hours a day 5 days a week  -Vit D 1000U daily  -Remove mepilex dressing on 9/17 (POD#7)- done-  will contact orthopedist regarding Dc staples  xray left Knee- add lidoderm to knees quyen during the day    #HLD  -Lopid 600mg BID     #Sleep:  - Maintain quiet hours and low stim environment  - Melatonin 6mg PRN    #GI/Bowel:  - PRN: Miralax 17 GM BID, Senna 2 tabs daily- last BM 9/23  - GI ppx: Protonix 40 mg daily    #/Bladder:  voiding well    #Diet / Dysphagia:    - Diet: Regular/TLC    #Skin/ Pressure Injury Prevention:  - assessment on admission:  LUE bruising, BLE ecchymosis, mepilex BL knees DCd, CDI, L buttock scab       #DVT prophylaxis:  -   BID x 42 days (end date 10/22)  - TEDs    #Precautions/ Restrictions  - Falls  - Ortho:      Weight bearing status: BLE WBAT

## 2024-09-24 NOTE — DISCHARGE NOTE PROVIDER - NSDCMRMEDTOKEN_GEN_ALL_CORE_FT
acetaminophen 325 mg oral tablet: 3 tab(s) orally every 8 hours as needed for  severe pain  aspirin 325 mg oral tablet: 1 tab(s) orally 2 times a day  cholecalciferol oral tablet: 1000 unit(s) orally once a day  cyanocobalamin 1000 mcg oral tablet: 1 tab(s) orally once a day (before a meal)  docusate sodium 100 mg oral capsule: 1 cap(s) orally 3 times a day  fluticasone 50 mcg/inh nasal spray: 1 spray(s) nasal once a day, As needed, Nasal congestion  folic acid 1 mg oral tablet: 1 tab(s) orally once a day  gabapentin 300 mg oral capsule: 2 cap(s) orally once a day (at bedtime)  gabapentin 300 mg oral capsule: 1 cap(s) orally 2 times a day  gemfibrozil 600 mg oral tablet: 1 tab(s) orally once a day  pantoprazole 40 mg oral delayed release tablet: 1 tab(s) orally once a day (before a meal)  senna leaf extract oral tablet: 2 tab(s) orally once a day (at bedtime) As needed Constipation

## 2024-09-24 NOTE — DISCHARGE NOTE PROVIDER - HOSPITAL COURSE
JENELLE COLEMAN is a 65 yo male with PMHx of high cholesterol, primary OA of b/l knees,  and diverticulitis s/p colectomy who presented to [facility] for elective bilateral total knee replacement with failed conservative treatment with medications. Patient had B/L TKR on 9/10/2024 with Dr. Thierno Tan. Hospital course unremarkable and without complications. Patient optimized, pain is well controlled and was evaluated by PM&R and therapy for functional deficits, gait/ADL impairments and acute rehabilitation was recommended. Patient was cleared for discharge to [facility] IRU on 9/16/24.     JENELLE COLEMAN is a 65 yo male with PMHx of high cholesterol, primary OA of b/l knees,  and diverticulitis s/p colectomy who presented to [facility] for elective bilateral total knee replacement with failed conservative treatment with medications. Patient had B/L TKR on 9/10/2024 with Dr. Thierno Tan. Hospital course unremarkable and without complications. Patient optimized, pain is well controlled and was evaluated by PM&R and therapy for functional deficits, gait/ADL impairments and acute rehabilitation was recommended. Patient was cleared for discharge to [facility] IRU on 9/16/24.  At [facility] rehab patient completed comprehensive PT OT program and reached his rehab goals on 9/27. While at rehab evaluated by hospitalist. US LEs neg DVT. XR left knee w/out complications. Neurontin increased. Bowel regimen. Staples removed. Cleared for dc home on 9/27.

## 2024-09-24 NOTE — PROGRESS NOTE ADULT - ASSESSMENT
65 y/o male with PMHx of hypertriglyceridemia, primary OA of b/l knees, h/o diverticulitis s/p colectomy who presented to [facility] for elective bilateral total knee replacement. Patient had B/L TKR on 9/10/2024 with Dr. Thierno Tan. Hospital course unremarkable and without complications. Admitted to [facility] for initiation of a multidisciplinary rehab program consisting focused on functional mobility, transfers and ADLs.    #Bilateral Total Knee Replacement  -B/L TKR on 9/10 with Dr. Thierno Tan  -ASA 325mg BID for VTE ppx s/p B/L TKR x 42 days (end date 10/22)  -Vit D 1000U daily  -Fall Precautions   -Continue comprehensive rehab program - PT/OT/SLP per rehab team  -Pain management, bowel regimen per rehab     #HLD  -Continue Lopid 600mg BID     #Anemia  -Stable, monitor  -low iron saturation but normal ferritin and TIBC  -low-normal ferritin and B12, started on folic acid and b12 PO    #DVT prophylaxis - ASA BID x 42 days (end date 10/22)  #GI ppx - protonix

## 2024-09-25 ENCOUNTER — TRANSCRIPTION ENCOUNTER (OUTPATIENT)
Age: 64
End: 2024-09-25

## 2024-09-25 DIAGNOSIS — M17.0 BILATERAL PRIMARY OSTEOARTHRITIS OF KNEE: ICD-10-CM

## 2024-09-25 DIAGNOSIS — Z96.653 PRESENCE OF ARTIFICIAL KNEE JOINT, BILATERAL: ICD-10-CM

## 2024-09-25 PROCEDURE — 99232 SBSQ HOSP IP/OBS MODERATE 35: CPT

## 2024-09-25 PROCEDURE — 99232 SBSQ HOSP IP/OBS MODERATE 35: CPT | Mod: GC

## 2024-09-25 RX ADMIN — FOLIC ACID 1 MILLIGRAM(S): 1 TABLET ORAL at 12:20

## 2024-09-25 RX ADMIN — Medication 325 MILLIGRAM(S): at 17:39

## 2024-09-25 RX ADMIN — GEMFIBROZIL 600 MILLIGRAM(S): 600 TABLET, FILM COATED ORAL at 08:28

## 2024-09-25 RX ADMIN — Medication 975 MILLIGRAM(S): at 00:56

## 2024-09-25 RX ADMIN — Medication 325 MILLIGRAM(S): at 05:59

## 2024-09-25 RX ADMIN — Medication 975 MILLIGRAM(S): at 01:50

## 2024-09-25 RX ADMIN — GABAPENTIN 300 MILLIGRAM(S): 800 TABLET, FILM COATED ORAL at 14:01

## 2024-09-25 RX ADMIN — Medication 975 MILLIGRAM(S): at 12:20

## 2024-09-25 RX ADMIN — Medication 975 MILLIGRAM(S): at 12:57

## 2024-09-25 RX ADMIN — LIDOCAINE 1 PATCH: 50 CREAM TOPICAL at 00:56

## 2024-09-25 RX ADMIN — GABAPENTIN 600 MILLIGRAM(S): 800 TABLET, FILM COATED ORAL at 21:08

## 2024-09-25 RX ADMIN — PANTOPRAZOLE SODIUM 40 MILLIGRAM(S): 40 TABLET, DELAYED RELEASE ORAL at 06:03

## 2024-09-25 RX ADMIN — Medication 1000 MICROGRAM(S): at 08:28

## 2024-09-25 RX ADMIN — Medication 17 GRAM(S): at 17:39

## 2024-09-25 RX ADMIN — Medication 975 MILLIGRAM(S): at 17:39

## 2024-09-25 RX ADMIN — Medication 975 MILLIGRAM(S): at 05:59

## 2024-09-25 RX ADMIN — LIDOCAINE 1 PATCH: 50 CREAM TOPICAL at 06:03

## 2024-09-25 RX ADMIN — GABAPENTIN 300 MILLIGRAM(S): 800 TABLET, FILM COATED ORAL at 08:29

## 2024-09-25 RX ADMIN — Medication 1000 UNIT(S): at 12:20

## 2024-09-25 NOTE — DISCHARGE NOTE NURSING/CASE MANAGEMENT/SOCIAL WORK - PATIENT PORTAL LINK FT
You can access the FollowMyHealth Patient Portal offered by [facility] by registering at the following website: [email or web address]. By joining [employer]’s FollowMyHealth portal, you will also be able to view your health information using other applications (apps) compatible with our system.

## 2024-09-25 NOTE — PROGRESS NOTE ADULT - ASSESSMENT
JENELLE COLEMAN is a 63 yo male with PMHx of high cholesterol, primary OA of b/l knees,  and diverticulitis s/p colectomy who presented to [facility] for elective bilateral total knee replacement. Patient had B/L TKR on 9/10/2024 with Dr. Thierno Tan. Hospital course unremarkable and without complications. Now admitted to [facility] after for initiation of a multidisciplinary rehab program consisting focused on functional mobility, transfers and ADLs.    #Bilateral Total Knee Replacement  - B/L TKR on 9/10 with Dr. Thierno Tan  - Continue multimodal pain control with Tylenol 975 QID, and Gabapentin 600 mg HS  - Oxycodone 5-10mg PRN    - Flexeril 5mg TID PRN  -  mg BID for VTE ppx s/p B/L TKR x 42 days (end date 10/22)  - Fall Precautions   - Comprehensive Multidisciplinary Rehab Program: Gait, ADL, Functional impairments; PT/OT/ SLP 3 hours a day 5 days a week  -Vit D 1000U daily  -Remove mepilex dressing on 9/17 (POD#7)- done-  will contact orthopedist regarding Dc staples  xray left Knee- add lidoderm to knees quyen during the day    #HLD  -Lopid 600mg BID     #Sleep:  - Maintain quiet hours and low stim environment  - Melatonin 6mg PRN    #GI/Bowel:  - PRN: Miralax 17 GM BID, Senna 2 tabs daily- last BM 9/23  - GI ppx: Protonix 40 mg daily    #/Bladder:  voiding well    #Diet / Dysphagia:    - Diet: Regular/TLC    #Skin/ Pressure Injury Prevention:  - assessment on admission:  LUE bruising, BLE ecchymosis, mepilex BL knees DCd, CDI, L buttock scab       #DVT prophylaxis:  -   BID x 42 days (end date 10/22)  - TEDs    #Precautions/ Restrictions  - Falls  - Ortho:      Weight bearing status: BLE WBAT

## 2024-09-25 NOTE — DISCHARGE NOTE NURSING/CASE MANAGEMENT/SOCIAL WORK - FLU SEASON?
Well controlled, patient not on any medication at this time.       -Will monitor for symptoms for now   -See assessment and plan above   Yes...

## 2024-09-25 NOTE — PROGRESS NOTE ADULT - ASSESSMENT
65 y/o male with PMHx of hypertriglyceridemia, primary OA of b/l knees, h/o diverticulitis s/p colectomy who presented to [facility] for elective bilateral total knee replacement. Patient had B/L TKR on 9/10/2024 with Dr. Thierno Tan. Hospital course unremarkable and without complications. Admitted to [facility] for initiation of a multidisciplinary rehab program consisting focused on functional mobility, transfers and ADLs.    #Bilateral Total Knee Replacement  -B/L TKR on 9/10 with Dr. Thierno Tan  -ASA 325mg BID for VTE ppx s/p B/L TKR x 42 days (end date 10/22)  -Vit D 1000U daily  -Fall Precautions   -Continue comprehensive rehab program - PT/OT/SLP per rehab team  -Pain management, bowel regimen per rehab     #HLD  -Continue Lopid 600mg BID     #Anemia  -Stable, monitor  -low iron saturation but normal ferritin and TIBC  -low-normal ferritin and B12, started on folic acid and b12 PO    #DVT prophylaxis - ASA BID x 42 days (end date 10/22)  #GI ppx - protonix

## 2024-09-25 NOTE — DISCHARGE NOTE NURSING/CASE MANAGEMENT/SOCIAL WORK - NSDCFUADDAPPT_GEN_ALL_CORE_FT
follow up Dr Galindo in 1 week.  Follow up PCP in 1 week.     Outpatient scripts provided to patient.   He will follow up independently.

## 2024-09-25 NOTE — PROGRESS NOTE ADULT - NS ATTEND AMEND GEN_ALL_CORE FT
Rehab Attending- Patient seen and examined by me - Case discussed, above note reviewed by me with modifications made    patient seen and evaluated in OT  Less Knee pain with lidoderm  Xray left knee -TKR intact  discussed in team conference- DC home Friday 9/27  to continue intensive rehab program      Vital Signs Last 24 Hrs  T(C): 36.6 (25 Sep 2024 09:22), Max: 36.6 (25 Sep 2024 09:22)  T(F): 97.9 (25 Sep 2024 09:22), Max: 97.9 (25 Sep 2024 09:22)  HR: 69 (25 Sep 2024 09:22) (63 - 71)  BP: 118/78 (25 Sep 2024 09:22) (111/73 - 120/74)  BP(mean): --  RR: 16 (25 Sep 2024 09:22) (16 - 17)  SpO2: 97% (25 Sep 2024 09:22) (97% - 97%)    Parameters below as of 25 Sep 2024 09:22  Patient On (Oxygen Delivery Method): room air      Gen - NAD, Comfortable  HEENT - NCAT, EOMI, MMM, PERRLA, Normal Conjunctivae  Neck - Supple, No limited ROM  Pulm - CTAB, No wheeze, No rhonchi, No crackles  Cardiovascular - RRR, S1S2, No murmurs  Chest - good chest expansion, good respiratory effort  Abdomen - Soft, NT/ND, +BS  Extremities - No C/C, no calf tenderness, BLE 1+ edema  Neuro-     Cognitive - awake, alert, oriented to person, place, date, year, and situation.  Able  to follow command          Cranial Nerves -No facial asymmetry, Tongue midline, EOMI, Shoulder shrug intact     Motor - No focal deficits. Right /Left hemiparesis                    LEFT    UE - ShAB 5/5, EF 5/5, EE 5/5,  5/5                    RIGHT UE - ShAB 5/5, EF 5/5, EE 5/5,   5/5                    LEFT    LE - HF 5/5, KE 4/5, DF 5/5, PF 5/5                    RIGHT LE - HF 5/5, KE 4+/5, DF 5/5, PF 5/5        Sensory - Intact  to LT bilaterally, decreased sensation to RLE (chronic)     Tone - normal  MSK: BL knee soreness 0-110 ROM right knee , 5-110 Left knee AROM  Psychiatric - Mood stable, Affect WNL  Skin:  LUE bruising, BLE ecchymosis, [place] TKR open to air with staples - incisions intact

## 2024-09-25 NOTE — DISCHARGE NOTE NURSING/CASE MANAGEMENT/SOCIAL WORK - NSDCPEFALRISK_GEN_ALL_CORE
For information on Fall & Injury Prevention, visit: [email or web address] OR  [email or web address] OR  https://www.cdc.gov/steadi/patient.html

## 2024-09-26 LAB
ALBUMIN SERPL ELPH-MCNC: 3.4 G/DL — SIGNIFICANT CHANGE UP (ref 3.3–5)
ALP SERPL-CCNC: 84 U/L — SIGNIFICANT CHANGE UP (ref 40–120)
ALT FLD-CCNC: 30 U/L — SIGNIFICANT CHANGE UP (ref 10–45)
ANION GAP SERPL CALC-SCNC: 7 MMOL/L — SIGNIFICANT CHANGE UP (ref 5–17)
AST SERPL-CCNC: 18 U/L — SIGNIFICANT CHANGE UP (ref 10–40)
BASOPHILS # BLD AUTO: 0.07 K/UL — SIGNIFICANT CHANGE UP (ref 0–0.2)
BASOPHILS NFR BLD AUTO: 1.2 % — SIGNIFICANT CHANGE UP (ref 0–2)
BILIRUB SERPL-MCNC: 0.8 MG/DL — SIGNIFICANT CHANGE UP (ref 0.2–1.2)
BUN SERPL-MCNC: 20 MG/DL — SIGNIFICANT CHANGE UP (ref 7–23)
CALCIUM SERPL-MCNC: 9.3 MG/DL — SIGNIFICANT CHANGE UP (ref 8.4–10.5)
CHLORIDE SERPL-SCNC: 104 MMOL/L — SIGNIFICANT CHANGE UP (ref 96–108)
CO2 SERPL-SCNC: 30 MMOL/L — SIGNIFICANT CHANGE UP (ref 22–31)
CREAT SERPL-MCNC: 0.89 MG/DL — SIGNIFICANT CHANGE UP (ref 0.5–1.3)
EGFR: 94 ML/MIN/1.73M2 — SIGNIFICANT CHANGE UP
EOSINOPHIL # BLD AUTO: 0.23 K/UL — SIGNIFICANT CHANGE UP (ref 0–0.5)
EOSINOPHIL NFR BLD AUTO: 3.9 % — SIGNIFICANT CHANGE UP (ref 0–6)
GLUCOSE SERPL-MCNC: 98 MG/DL — SIGNIFICANT CHANGE UP (ref 70–99)
HCT VFR BLD CALC: 34.5 % — LOW (ref 39–50)
HGB BLD-MCNC: 10.9 G/DL — LOW (ref 13–17)
IMM GRANULOCYTES NFR BLD AUTO: 0.7 % — SIGNIFICANT CHANGE UP (ref 0–0.9)
LYMPHOCYTES # BLD AUTO: 1.54 K/UL — SIGNIFICANT CHANGE UP (ref 1–3.3)
LYMPHOCYTES # BLD AUTO: 26 % — SIGNIFICANT CHANGE UP (ref 13–44)
MCHC RBC-ENTMCNC: 29.6 PG — SIGNIFICANT CHANGE UP (ref 27–34)
MCHC RBC-ENTMCNC: 31.6 GM/DL — LOW (ref 32–36)
MCV RBC AUTO: 93.8 FL — SIGNIFICANT CHANGE UP (ref 80–100)
MONOCYTES # BLD AUTO: 0.66 K/UL — SIGNIFICANT CHANGE UP (ref 0–0.9)
MONOCYTES NFR BLD AUTO: 11.1 % — SIGNIFICANT CHANGE UP (ref 2–14)
NEUTROPHILS # BLD AUTO: 3.39 K/UL — SIGNIFICANT CHANGE UP (ref 1.8–7.4)
NEUTROPHILS NFR BLD AUTO: 57.1 % — SIGNIFICANT CHANGE UP (ref 43–77)
NRBC # BLD: 0 /100 WBCS — SIGNIFICANT CHANGE UP (ref 0–0)
PLATELET # BLD AUTO: 321 K/UL — SIGNIFICANT CHANGE UP (ref 150–400)
POTASSIUM SERPL-MCNC: 4.7 MMOL/L — SIGNIFICANT CHANGE UP (ref 3.5–5.3)
POTASSIUM SERPL-SCNC: 4.7 MMOL/L — SIGNIFICANT CHANGE UP (ref 3.5–5.3)
PROT SERPL-MCNC: 6.5 G/DL — SIGNIFICANT CHANGE UP (ref 6–8.3)
RBC # BLD: 3.68 M/UL — LOW (ref 4.2–5.8)
RBC # FLD: 15 % — HIGH (ref 10.3–14.5)
SODIUM SERPL-SCNC: 141 MMOL/L — SIGNIFICANT CHANGE UP (ref 135–145)
WBC # BLD: 5.93 K/UL — SIGNIFICANT CHANGE UP (ref 3.8–10.5)
WBC # FLD AUTO: 5.93 K/UL — SIGNIFICANT CHANGE UP (ref 3.8–10.5)

## 2024-09-26 PROCEDURE — 99232 SBSQ HOSP IP/OBS MODERATE 35: CPT

## 2024-09-26 RX ORDER — GABAPENTIN 800 MG/1
2 TABLET, FILM COATED ORAL
Qty: 60 | Refills: 0
Start: 2024-09-26 | End: 2024-10-25

## 2024-09-26 RX ORDER — GABAPENTIN 800 MG/1
1 TABLET, FILM COATED ORAL
Qty: 60 | Refills: 0
Start: 2024-09-26 | End: 2024-10-25

## 2024-09-26 RX ORDER — PANTOPRAZOLE SODIUM 40 MG/1
1 TABLET, DELAYED RELEASE ORAL
Qty: 30 | Refills: 0
Start: 2024-09-26 | End: 2024-10-25

## 2024-09-26 RX ADMIN — GABAPENTIN 300 MILLIGRAM(S): 800 TABLET, FILM COATED ORAL at 13:03

## 2024-09-26 RX ADMIN — Medication 975 MILLIGRAM(S): at 06:33

## 2024-09-26 RX ADMIN — Medication 325 MILLIGRAM(S): at 06:33

## 2024-09-26 RX ADMIN — Medication 325 MILLIGRAM(S): at 18:11

## 2024-09-26 RX ADMIN — Medication 1000 UNIT(S): at 12:57

## 2024-09-26 RX ADMIN — GABAPENTIN 300 MILLIGRAM(S): 800 TABLET, FILM COATED ORAL at 08:13

## 2024-09-26 RX ADMIN — LIDOCAINE 1 PATCH: 50 CREAM TOPICAL at 20:41

## 2024-09-26 RX ADMIN — Medication 17 GRAM(S): at 18:10

## 2024-09-26 RX ADMIN — Medication 975 MILLIGRAM(S): at 12:57

## 2024-09-26 RX ADMIN — LIDOCAINE 1 PATCH: 50 CREAM TOPICAL at 12:54

## 2024-09-26 RX ADMIN — FOLIC ACID 1 MILLIGRAM(S): 1 TABLET ORAL at 12:58

## 2024-09-26 RX ADMIN — Medication 1000 MICROGRAM(S): at 08:13

## 2024-09-26 RX ADMIN — Medication 975 MILLIGRAM(S): at 07:08

## 2024-09-26 RX ADMIN — GEMFIBROZIL 600 MILLIGRAM(S): 600 TABLET, FILM COATED ORAL at 08:13

## 2024-09-26 RX ADMIN — Medication 975 MILLIGRAM(S): at 18:11

## 2024-09-26 RX ADMIN — Medication 975 MILLIGRAM(S): at 00:10

## 2024-09-26 RX ADMIN — Medication 975 MILLIGRAM(S): at 01:10

## 2024-09-26 RX ADMIN — GABAPENTIN 600 MILLIGRAM(S): 800 TABLET, FILM COATED ORAL at 21:05

## 2024-09-26 RX ADMIN — PANTOPRAZOLE SODIUM 40 MILLIGRAM(S): 40 TABLET, DELAYED RELEASE ORAL at 06:33

## 2024-09-26 NOTE — PROGRESS NOTE ADULT - ASSESSMENT
65 y/o male with PMHx of hypertriglyceridemia, primary OA of b/l knees, h/o diverticulitis s/p colectomy who presented to [facility] for elective bilateral total knee replacement. Patient had B/L TKR on 9/10/2024 with Dr. Thierno Tan. Hospital course unremarkable and without complications. Admitted to [facility] for initiation of a multidisciplinary rehab program consisting focused on functional mobility, transfers and ADLs.    #Bilateral Total Knee Replacement  -B/L TKR on 9/10 with Dr. Thierno Tan  -ASA 325mg BID for VTE ppx s/p B/L TKR x 42 days (end date 10/22)  -Vit D 1000U daily  -Fall Precautions   -Continue comprehensive rehab program - PT/OT/SLP per rehab team  -Pain management, bowel regimen per rehab     #HLD  -Continue Lopid 600mg BID     #Anemia  -Stable, monitor  -low iron saturation but normal ferritin and TIBC  -low-normal ferritin and B12, started on folic acid and b12 PO    #DVT prophylaxis - ASA BID x 42 days (end date 10/22)  #GI ppx - protonix

## 2024-09-26 NOTE — PROGRESS NOTE ADULT - ASSESSMENT
JENELLE COLEMAN is a 65 yo male with PMHx of high cholesterol, primary OA of b/l knees,  and diverticulitis s/p colectomy who presented to [facility] for elective bilateral total knee replacement. Patient had B/L TKR on 9/10/2024 with Dr. Thierno Tan. Hospital course unremarkable and without complications. Now admitted to [facility] after for initiation of a multidisciplinary rehab program consisting focused on functional mobility, transfers and ADLs.    #Bilateral Total Knee Replacement  - B/L TKR on 9/10 with Dr. Thierno Tan  - Continue multimodal pain control with Tylenol 975 QID, and Gabapentin 600 mg HS  - Oxycodone 5-10mg PRN    - Flexeril 5mg TID PRN  -  mg BID for VTE ppx s/p B/L TKR x 42 days (end date 10/22)  - Fall Precautions   - Comprehensive Multidisciplinary Rehab Program: Gait, ADL, Functional impairments; PT/OT/ SLP 3 hours a day 5 days a week  -Vit D 1000U daily  -Remove mepilex dressing on 9/17 (POD#7)- done-  will contact orthopedist regarding Dc staples  xray left Knee ok- add lidoderm to knees quyen during the day    #HLD  -Lopid 600mg BID     #Sleep:  - Maintain quiet hours and low stim environment  - Melatonin 6mg PRN    #GI/Bowel:  - PRN: Miralax 17 GM BID, Senna 2 tabs daily- BM x1  - GI ppx: Protonix 40 mg daily    #/Bladder:  voiding well    #Diet / Dysphagia:    - Diet: Regular/TLC    #Skin/ Pressure Injury Prevention:  - assessment on admission:  LUE bruising, BLE ecchymosis, mepilex BL knees DCd, CDI, L buttock scab       #DVT prophylaxis:  -   BID x 42 days (end date 10/22)  - TEDs    #Precautions/ Restrictions  - Falls  - Ortho:      Weight bearing status: BLE WBAT

## 2024-09-27 VITALS
OXYGEN SATURATION: 97 % | SYSTOLIC BLOOD PRESSURE: 114 MMHG | TEMPERATURE: 98 F | DIASTOLIC BLOOD PRESSURE: 68 MMHG | RESPIRATION RATE: 16 BRPM | HEART RATE: 71 BPM

## 2024-09-27 PROCEDURE — 99232 SBSQ HOSP IP/OBS MODERATE 35: CPT | Mod: GC

## 2024-09-27 PROCEDURE — 99232 SBSQ HOSP IP/OBS MODERATE 35: CPT

## 2024-09-27 RX ADMIN — PANTOPRAZOLE SODIUM 40 MILLIGRAM(S): 40 TABLET, DELAYED RELEASE ORAL at 05:14

## 2024-09-27 RX ADMIN — Medication 975 MILLIGRAM(S): at 11:50

## 2024-09-27 RX ADMIN — Medication 1000 UNIT(S): at 11:51

## 2024-09-27 RX ADMIN — GEMFIBROZIL 600 MILLIGRAM(S): 600 TABLET, FILM COATED ORAL at 08:59

## 2024-09-27 RX ADMIN — LIDOCAINE 1 PATCH: 50 CREAM TOPICAL at 11:51

## 2024-09-27 RX ADMIN — GABAPENTIN 300 MILLIGRAM(S): 800 TABLET, FILM COATED ORAL at 08:58

## 2024-09-27 RX ADMIN — LIDOCAINE 1 PATCH: 50 CREAM TOPICAL at 01:07

## 2024-09-27 RX ADMIN — Medication 1000 MICROGRAM(S): at 08:59

## 2024-09-27 RX ADMIN — Medication 975 MILLIGRAM(S): at 01:23

## 2024-09-27 RX ADMIN — FOLIC ACID 1 MILLIGRAM(S): 1 TABLET ORAL at 11:51

## 2024-09-27 RX ADMIN — Medication 975 MILLIGRAM(S): at 05:14

## 2024-09-27 RX ADMIN — Medication 325 MILLIGRAM(S): at 05:13

## 2024-09-27 RX ADMIN — Medication 975 MILLIGRAM(S): at 06:00

## 2024-09-27 NOTE — PROGRESS NOTE ADULT - SUBJECTIVE AND OBJECTIVE BOX
Chief complaint- difficulty ambulating- SP [facility] TKR    JENELLE COLEMAN is a 65 yo male with PMHx of high cholesterol, primary OA of b/l knees,  and diverticulitis s/p colectomy who presented to [facility] for elective bilateral total knee replacement with failed conservative treatment with medications. Patient had B/L TKR on 9/10/2024 with Dr. Thierno Tan. Hospital course unremarkable and without complications. Patient optimized, pain is well controlled and was evaluated by PM&R and therapy for functional deficits, gait/ADL impairments and acute rehabilitation was recommended. Patient was cleared for discharge to [facility] IRU on 9/16/24.    ROS/subjective:  patient seen and evaluated in PT  no overnight issues  Moved Bowels but wants 2 doses of miralax today   Stiffness/pain in left knee improving  no dizziness, no headaches, no SOB, No chest pain, no nausea, no vomiting  tolerating increased gabapentin- no lethargy- reports less LE burning          MEDICATIONS  (STANDING):  acetaminophen     Tablet .. 975 milliGRAM(s) Oral every 6 hours  aspirin 325 milliGRAM(s) Oral two times a day  cholecalciferol 1000 Unit(s) Oral daily  gabapentin 300 milliGRAM(s) Oral <User Schedule>  gabapentin 600 milliGRAM(s) Oral at bedtime  gemfibrozil 600 milliGRAM(s) Oral <User Schedule>  pantoprazole    Tablet 40 milliGRAM(s) Oral before breakfast  polyethylene glycol 3350 17 Gram(s) Oral daily    MEDICATIONS  (PRN):  cyclobenzaprine 5 milliGRAM(s) Oral three times a day PRN Muscle Spasm  oxyCODONE    IR 10 milliGRAM(s) Oral every 6 hours PRN Severe Pain (7 - 10)  oxyCODONE    IR 5 milliGRAM(s) Oral every 6 hours PRN Moderate Pain (4 - 6)  senna 2 Tablet(s) Oral at bedtime PRN Constipation                            10.1   7.09  )-----------( 272      ( 19 Sep 2024 07:22 )             30.2     09-19    139  |  103  |  20  ----------------------------<  101[H]  4.4   |  30  |  0.88    Ca    9.1      19 Sep 2024 07:22    TPro  6.2  /  Alb  3.1[L]  /  TBili  1.1  /  DBili  x   /  AST  30  /  ALT  58[H]  /  AlkPhos  83  09-19    Urinalysis Basic - ( 19 Sep 2024 07:22 )    Color: x / Appearance: x / SG: x / pH: x  Gluc: 101 mg/dL / Ketone: x  / Bili: x / Urobili: x   Blood: x / Protein: x / Nitrite: x   Leuk Esterase: x / RBC: x / WBC x   Sq Epi: x / Non Sq Epi: x / Bacteria: x        CAPILLARY BLOOD GLUCOSE          Vital Signs Last 24 Hrs  T(C): 36.7 (20 Sep 2024 07:37), Max: 36.7 (20 Sep 2024 07:37)  T(F): 98.1 (20 Sep 2024 07:37), Max: 98.1 (20 Sep 2024 07:37)  HR: 74 (20 Sep 2024 07:37) (74 - 85)  BP: 102/65 (20 Sep 2024 07:37) (102/65 - 134/83)  BP(mean): --  RR: 16 (20 Sep 2024 07:37) (15 - 16)  SpO2: 93% (20 Sep 2024 07:37) (93% - 97%)    Parameters below as of 20 Sep 2024 07:37  Patient On (Oxygen Delivery Method): room air      Gen - NAD, Comfortable  HEENT - NCAT, EOMI, MMM, PERRLA, Normal Conjunctivae  Neck - Supple, No limited ROM  Pulm - CTAB, No wheeze, No rhonchi, No crackles  Cardiovascular - RRR, S1S2, No murmurs  Chest - good chest expansion, good respiratory effort  Abdomen - Soft, NT/ND, +BS  Extremities - No C/C, no calf tenderness, BLE 1+ edema  Neuro-     Cognitive - awake, alert, oriented to person, place, date, year, and situation.  Able  to follow command     Communication - Fluent, Comprehensible, No dysarthria, No aphasia      Cranial Nerves -No facial asymmetry, Tongue midline, EOMI, Shoulder shrug intact     Motor - No focal deficits. Right /Left hemiparesis                    LEFT    UE - ShAB 5/5, EF 5/5, EE 5/5,  5/5                    RIGHT UE - ShAB 5/5, EF 5/5, EE 5/5,   5/5                    LEFT    LE - HF 5/5, KE 3-/5, DF 5/5, PF 5/5                    RIGHT LE - HF 5/5, KE 4/5, DF 5/5, PF 5/5        Sensory - Intact  to LT bilaterally, decreased sensation to RLE (chronic)     Tone - normal  MSK: BL knee soreness 0-110 ROM right knee , 5-100 Left knee AROM  Psychiatric - Mood stable, Affect WNL  Skin:  LUE bruising, BLE ecchymosis, [facility] TKR open to air with staples - incisions intact    IDT in [facility] with OPD PT- 9/27
Chief complaint- difficulty ambulating- SP [facility] TKR    JENELLE COLEMAN is a 65 yo male with PMHx of high cholesterol, primary OA of b/l knees,  and diverticulitis s/p colectomy who presented to [facility] for elective bilateral total knee replacement with failed conservative treatment with medications. Patient had B/L TKR on 9/10/2024 with Dr. Thierno Tan. Hospital course unremarkable and without complications. Patient optimized, pain is well controlled and was evaluated by PM&R and therapy for functional deficits, gait/ADL impairments and acute rehabilitation was recommended. Patient was cleared for discharge to [facility] IRU on 9/16/24.    ROS/subjective:  patient seen and evaluated in PT  no overnight issues  still no BM - wants additional Miralax today  Stiffness/pain in left knee- > right  no dizziness, no headaches, no SOB, No chest pain, no nausea, no vomiting  tolerating increased gabapentin- no lethargy- reports less LE burning      MEDICATIONS  (STANDING):  acetaminophen     Tablet .. 975 milliGRAM(s) Oral every 6 hours  aspirin 325 milliGRAM(s) Oral two times a day  cholecalciferol 1000 Unit(s) Oral daily  gabapentin 600 milliGRAM(s) Oral at bedtime  gabapentin 300 milliGRAM(s) Oral <User Schedule>  gemfibrozil 600 milliGRAM(s) Oral <User Schedule>  pantoprazole    Tablet 40 milliGRAM(s) Oral before breakfast  polyethylene glycol 3350 17 Gram(s) Oral daily  polyethylene glycol 3350 17 Gram(s) Oral once    MEDICATIONS  (PRN):  cyclobenzaprine 5 milliGRAM(s) Oral three times a day PRN Muscle Spasm  oxyCODONE    IR 10 milliGRAM(s) Oral every 6 hours PRN Severe Pain (7 - 10)  oxyCODONE    IR 5 milliGRAM(s) Oral every 6 hours PRN Moderate Pain (4 - 6)  senna 2 Tablet(s) Oral at bedtime PRN Constipation                            10.1   7.09  )-----------( 272      ( 19 Sep 2024 07:22 )             30.2     09-19    139  |  103  |  20  ----------------------------<  101[H]  4.4   |  30  |  0.88    Ca    9.1      19 Sep 2024 07:22    TPro  6.2  /  Alb  3.1[L]  /  TBili  1.1  /  DBili  x   /  AST  30  /  ALT  58[H]  /  AlkPhos  83  09-19    Urinalysis Basic - ( 19 Sep 2024 07:22 )    Color: x / Appearance: x / SG: x / pH: x  Gluc: 101 mg/dL / Ketone: x  / Bili: x / Urobili: x   Blood: x / Protein: x / Nitrite: x   Leuk Esterase: x / RBC: x / WBC x   Sq Epi: x / Non Sq Epi: x / Bacteria: x        CAPILLARY BLOOD GLUCOSE          Vital Signs Last 24 Hrs  T(C): 36.6 (19 Sep 2024 08:01), Max: 36.7 (18 Sep 2024 19:56)  T(F): 97.9 (19 Sep 2024 08:01), Max: 98.1 (18 Sep 2024 19:56)  HR: 77 (19 Sep 2024 08:01) (77 - 82)  BP: 110/72 (19 Sep 2024 08:01) (110/72 - 119/79)  BP(mean): --  RR: 14 (19 Sep 2024 08:01) (14 - 16)  SpO2: 97% (19 Sep 2024 08:01) (97% - 99%)    Parameters below as of 19 Sep 2024 08:01  Patient On (Oxygen Delivery Method): room air      Gen - NAD, Comfortable  HEENT - NCAT, EOMI, MMM, PERRLA, Normal Conjunctivae  Neck - Supple, No limited ROM  Pulm - CTAB, No wheeze, No rhonchi, No crackles  Cardiovascular - RRR, S1S2, No murmurs  Chest - good chest expansion, good respiratory effort  Abdomen - Soft, NT/ND, +BS  Extremities - No C/C, no calf tenderness, BLE 1+ edema  Neuro-     Cognitive - awake, alert, oriented to person, place, date, year, and situation.  Able  to follow command     Communication - Fluent, Comprehensible, No dysarthria, No aphasia      Cranial Nerves -No facial asymmetry, Tongue midline, EOMI, Shoulder shrug intact     Motor - No focal deficits. Right /Left hemiparesis                    LEFT    UE - ShAB 5/5, EF 5/5, EE 5/5,  5/5                    RIGHT UE - ShAB 5/5, EF 5/5, EE 5/5,   5/5                    LEFT    LE - HF 5/5, KE 3-/5, DF 5/5, PF 5/5                    RIGHT LE - HF 5/5, KE 4/5, DF 5/5, PF 5/5        Sensory - Intact  to LT bilaterally, decreased sensation to RLE (chronic)     Tone - normal  MSK: BL knee soreness 0-110 ROM right knee , 5-100 Left knee AROM  Psychiatric - Mood stable, Affect WNL  Skin:  LUE bruising, BLE ecchymosis, [facility] TKR open to air with staples - incisions intact    IDT in [facility] home with OPD PT- 9/27
Chief complaint- difficulty ambulating- SP [facility] TKR    JENELLE COLEMAN is a 65 yo male with PMHx of high cholesterol, primary OA of b/l knees,  and diverticulitis s/p colectomy who presented to [facility] for elective bilateral total knee replacement with failed conservative treatment with medications. Patient had B/L TKR on 9/10/2024 with Dr. Thierno Tan. Hospital course unremarkable and without complications. Patient optimized, pain is well controlled and was evaluated by PM&R and therapy for functional deficits, gait/ADL impairments and acute rehabilitation was recommended. Patient was cleared for discharge to [facility] IRU on 9/16/24.    ROS/subjective:  patient seen and evaluated in PT  no overnight issues  no BM x 2 days- encouraged to take Senna, miralax daily  Stiffness/pain in left knee- > right  no dizziness, no headaches, no SOB, No chest pain, no nausea, no vomiting  tolerating increased gabapentin- no lethargy    MEDICATIONS  (STANDING):  acetaminophen     Tablet .. 975 milliGRAM(s) Oral every 6 hours  aspirin 325 milliGRAM(s) Oral two times a day  cholecalciferol 1000 Unit(s) Oral daily  gabapentin 300 milliGRAM(s) Oral <User Schedule>  gabapentin 600 milliGRAM(s) Oral at bedtime  gemfibrozil 600 milliGRAM(s) Oral <User Schedule>  pantoprazole    Tablet 40 milliGRAM(s) Oral before breakfast  polyethylene glycol 3350 17 Gram(s) Oral daily    MEDICATIONS  (PRN):  cyclobenzaprine 5 milliGRAM(s) Oral three times a day PRN Muscle Spasm  oxyCODONE    IR 10 milliGRAM(s) Oral every 6 hours PRN Severe Pain (7 - 10)  oxyCODONE    IR 5 milliGRAM(s) Oral every 6 hours PRN Moderate Pain (4 - 6)  senna 2 Tablet(s) Oral at bedtime PRN Constipation                            9.5    5.79  )-----------( 227      ( 17 Sep 2024 05:40 )             28.7     09-17    142  |  104  |  17  ----------------------------<  100[H]  4.2   |  33[H]  |  0.85    Ca    9.2      17 Sep 2024 05:40    TPro  6.1  /  Alb  2.9[L]  /  TBili  1.0  /  DBili  x   /  AST  37  /  ALT  43  /  AlkPhos  69  09-17    Urinalysis Basic - ( 17 Sep 2024 05:40 )    Color: x / Appearance: x / SG: x / pH: x  Gluc: 100 mg/dL / Ketone: x  / Bili: x / Urobili: x   Blood: x / Protein: x / Nitrite: x   Leuk Esterase: x / RBC: x / WBC x   Sq Epi: x / Non Sq Epi: x / Bacteria: x        CAPILLARY BLOOD GLUCOSE          Vital Signs Last 24 Hrs  T(C): 36.7 (18 Sep 2024 08:37), Max: 36.7 (17 Sep 2024 19:50)  T(F): 98.1 (18 Sep 2024 08:37), Max: 98.1 (17 Sep 2024 19:50)  HR: 81 (18 Sep 2024 08:37) (81 - 82)  BP: 100/65 (18 Sep 2024 08:37) (100/65 - 104/66)  BP(mean): --  RR: 16 (18 Sep 2024 08:37) (16 - 16)  SpO2: 97% (18 Sep 2024 08:37) (95% - 97%)    Parameters below as of 18 Sep 2024 08:37  Patient On (Oxygen Delivery Method): room air    Gen - NAD, Comfortable  HEENT - NCAT, EOMI, MMM, PERRLA, Normal Conjunctivae  Neck - Supple, No limited ROM  Pulm - CTAB, No wheeze, No rhonchi, No crackles  Cardiovascular - RRR, S1S2, No murmurs  Chest - good chest expansion, good respiratory effort  Abdomen - Soft, NT/ND, +BS  Extremities - No C/C, no calf tenderness, BLE 1+ edema  Neuro-     Cognitive - awake, alert, oriented to person, place, date, year, and situation.  Able  to follow command     Communication - Fluent, Comprehensible, No dysarthria, No aphasia      Cranial Nerves -No facial asymmetry, Tongue midline, EOMI, Shoulder shrug intact     Motor - No focal deficits. Right /Left hemiparesis                    LEFT    UE - ShAB 5/5, EF 5/5, EE 5/5,  5/5                    RIGHT UE - ShAB 5/5, EF 5/5, EE 5/5,   5/5                    LEFT    LE - HF 5/5, KE 3/5, DF 5/5, PF 5/5                    RIGHT LE - HF 5/5, KE 2/5 limited 2/2 pain, DF 5/5, PF 5/5        Sensory - Intact  to LT bilaterally, decreased sensation to RLE (chronic)     Tone - normal  MSK: BL knee soreness 0-110 ROM right knee ,5-100 Left knee AROM  Psychiatric - Mood stable, Affect WNL  Skin:  LUE bruising, BLE ecchymosis, [facility] TKR open to air with staples - incisions intact    IDT in [facility] with OPD PT- 9/27
Patient is a 64y old  Male who presents with a chief complaint of Bilateral Total knee replacement (26 Sep 2024 10:33)      Patient seen and examined at bedside. NAD. denies acute medical complaints.    ALLERGIES:  SEASONAL ALLERGIES - STUFFY NOSE - USES LORATIDINE AND FLONASE DAILY (Other)  No Known Drug Allergies    MEDICATIONS  (STANDING):  acetaminophen     Tablet .. 975 milliGRAM(s) Oral every 6 hours  aspirin 325 milliGRAM(s) Oral two times a day  cholecalciferol 1000 Unit(s) Oral daily  cyanocobalamin 1000 MICROGram(s) Oral with breakfast  folic acid 1 milliGRAM(s) Oral daily  gabapentin 300 milliGRAM(s) Oral <User Schedule>  gabapentin 600 milliGRAM(s) Oral at bedtime  gemfibrozil 600 milliGRAM(s) Oral <User Schedule>  lidocaine   4% Patch 1 Patch Transdermal daily  pantoprazole    Tablet 40 milliGRAM(s) Oral before breakfast  polyethylene glycol 3350 17 Gram(s) Oral two times a day    MEDICATIONS  (PRN):  cyclobenzaprine 5 milliGRAM(s) Oral three times a day PRN Muscle Spasm  senna 2 Tablet(s) Oral at bedtime PRN Constipation    Vital Signs Last 24 Hrs  T(F): 97.9 (26 Sep 2024 08:09), Max: 97.9 (26 Sep 2024 08:09)  HR: 85 (26 Sep 2024 08:09) (75 - 85)  BP: 120/76 (26 Sep 2024 08:09) (99/64 - 120/76)  RR: 16 (26 Sep 2024 08:09) (16 - 16)  SpO2: 95% (26 Sep 2024 08:09) (95% - 95%)  I&O's Summary      PHYSICAL EXAM:  General: NAD, A/O x 3  ENT: MMM, no scleral icterus  Neck: Supple, No JVD  Lungs: Clear to auscultation bilaterally, no wheezes, rales, rhonchi  Cardio: RRR, S1/S2  Abdomen: Soft, Nontender, Nondistended; Bowel sounds present  Extremities: No calf tenderness, No pitting edema. b/l knee surgical incision healing well    LABS:                        10.9   5.93  )-----------( 321      ( 26 Sep 2024 06:58 )             34.5       09-26    141  |  104  |  20  ----------------------------<  98  4.7   |  30  |  0.89    Ca    9.3      26 Sep 2024 06:58    TPro  6.5  /  Alb  3.4  /  TBili  0.8  /  DBili  x   /  AST  18  /  ALT  30  /  AlkPhos  84  09-26                                  Urinalysis Basic - ( 26 Sep 2024 06:58 )    Color: x / Appearance: x / SG: x / pH: x  Gluc: 98 mg/dL / Ketone: x  / Bili: x / Urobili: x   Blood: x / Protein: x / Nitrite: x   Leuk Esterase: x / RBC: x / WBC x   Sq Epi: x / Non Sq Epi: x / Bacteria: x        COVID-19 PCR: NotDetec (09-16-24 @ 18:20)      RADIOLOGY & ADDITIONAL TESTS:    Care Discussed with Consultants/Other Providers: yes, rehab  
Patient is a 64y old  Male who presents with a chief complaint of Bilateral Total knee replacement (26 Sep 2024 11:36)      Patient seen and examined at bedside, NAD. denies acute medical complaints. pain controlled.       ALLERGIES:  SEASONAL ALLERGIES - STUFFY NOSE - USES LORATIDINE AND FLONASE DAILY (Other)  No Known Drug Allergies    MEDICATIONS  (STANDING):  acetaminophen     Tablet .. 975 milliGRAM(s) Oral every 6 hours  aspirin 325 milliGRAM(s) Oral two times a day  cholecalciferol 1000 Unit(s) Oral daily  cyanocobalamin 1000 MICROGram(s) Oral with breakfast  folic acid 1 milliGRAM(s) Oral daily  gabapentin 600 milliGRAM(s) Oral at bedtime  gabapentin 300 milliGRAM(s) Oral <User Schedule>  gemfibrozil 600 milliGRAM(s) Oral <User Schedule>  lidocaine   4% Patch 1 Patch Transdermal daily  pantoprazole    Tablet 40 milliGRAM(s) Oral before breakfast  polyethylene glycol 3350 17 Gram(s) Oral two times a day    MEDICATIONS  (PRN):  cyclobenzaprine 5 milliGRAM(s) Oral three times a day PRN Muscle Spasm  senna 2 Tablet(s) Oral at bedtime PRN Constipation    Vital Signs Last 24 Hrs  T(F): 98.4 (27 Sep 2024 08:49), Max: 98.4 (27 Sep 2024 08:49)  HR: 71 (27 Sep 2024 08:49) (71 - 80)  BP: 114/68 (27 Sep 2024 08:49) (111/73 - 114/68)  RR: 16 (27 Sep 2024 08:49) (16 - 16)  SpO2: 97% (27 Sep 2024 08:49) (96% - 97%)  I&O's Summary    26 Sep 2024 07:01  -  27 Sep 2024 07:00  --------------------------------------------------------  IN: 0 mL / OUT: 750 mL / NET: -750 mL      PHYSICAL EXAM:  General: NAD, A/O x 3  ENT: MMM, no scleral icterus  Neck: Supple, No JVD  Lungs: Clear to auscultation bilaterally, no wheezes, rales, rhonchi  Cardio: RRR, S1/S2  Abdomen: Soft, Nontender, Nondistended; Bowel sounds present  Extremities: No calf tenderness, No pitting edema. b/l knee surgical incision healing well      LABS:                        10.9   5.93  )-----------( 321      ( 26 Sep 2024 06:58 )             34.5       09-26    141  |  104  |  20  ----------------------------<  98  4.7   |  30  |  0.89    Ca    9.3      26 Sep 2024 06:58    TPro  6.5  /  Alb  3.4  /  TBili  0.8  /  DBili  x   /  AST  18  /  ALT  30  /  AlkPhos  84  09-26                                  Urinalysis Basic - ( 26 Sep 2024 06:58 )    Color: x / Appearance: x / SG: x / pH: x  Gluc: 98 mg/dL / Ketone: x  / Bili: x / Urobili: x   Blood: x / Protein: x / Nitrite: x   Leuk Esterase: x / RBC: x / WBC x   Sq Epi: x / Non Sq Epi: x / Bacteria: x        COVID-19 PCR: NotDetec (09-16-24 @ 18:20)      RADIOLOGY & ADDITIONAL TESTS:    Care Discussed with Consultants/Other Providers: yes, rehab  
Chief complaint- difficulty ambulating- SP [facility] TKR    JENELLE COLEMAN is a 63 yo male with PMHx of high cholesterol, primary OA of b/l knees,  and diverticulitis s/p colectomy who presented to [facility] for elective bilateral total knee replacement with failed conservative treatment with medications. Patient had B/L TKR on 9/10/2024 with Dr. Thierno Tan. Hospital course unremarkable and without complications. Patient optimized, pain is well controlled and was evaluated by PM&R and therapy for functional deficits, gait/ADL impairments and acute rehabilitation was recommended. Patient was cleared for discharge to [facility] IRU on 9/16/24.    ROS/subjective:  patient seen and evaluated bedside  no overnight events  good BMs  took shower  Stiffness/pain in left knee improving  no dizziness, no headaches, no SOB, No chest pain, no nausea, no vomiting  tolerating increased gabapentin- no lethargy- reports less LE burning  to contact Ortho regarding Dc staples      MEDICATIONS  (STANDING):  acetaminophen     Tablet .. 975 milliGRAM(s) Oral every 6 hours  aspirin 325 milliGRAM(s) Oral two times a day  cholecalciferol 1000 Unit(s) Oral daily  cyanocobalamin 1000 MICROGram(s) Oral with breakfast  folic acid 1 milliGRAM(s) Oral daily  gabapentin 300 milliGRAM(s) Oral <User Schedule>  gabapentin 600 milliGRAM(s) Oral at bedtime  gemfibrozil 600 milliGRAM(s) Oral <User Schedule>  pantoprazole    Tablet 40 milliGRAM(s) Oral before breakfast  polyethylene glycol 3350 17 Gram(s) Oral two times a day    MEDICATIONS  (PRN):  cyclobenzaprine 5 milliGRAM(s) Oral three times a day PRN Muscle Spasm  oxyCODONE    IR 5 milliGRAM(s) Oral every 6 hours PRN Moderate Pain (4 - 6)  oxyCODONE    IR 10 milliGRAM(s) Oral every 6 hours PRN Severe Pain (7 - 10)  senna 2 Tablet(s) Oral at bedtime PRN Constipation                            10.2   6.55  )-----------( 306      ( 23 Sep 2024 06:30 )             32.2     09-23    137  |  101  |  22  ----------------------------<  97  4.4   |  29  |  1.00    Ca    9.2      23 Sep 2024 06:30    TPro  6.3  /  Alb  3.3  /  TBili  0.8  /  DBili  x   /  AST  26  /  ALT  36  /  AlkPhos  83  09-23    Urinalysis Basic - ( 23 Sep 2024 06:30 )    Color: x / Appearance: x / SG: x / pH: x  Gluc: 97 mg/dL / Ketone: x  / Bili: x / Urobili: x   Blood: x / Protein: x / Nitrite: x   Leuk Esterase: x / RBC: x / WBC x   Sq Epi: x / Non Sq Epi: x / Bacteria: x        CAPILLARY BLOOD GLUCOSE          Vital Signs Last 24 Hrs  T(C): 36.3 (23 Sep 2024 08:36), Max: 36.7 (22 Sep 2024 21:05)  T(F): 97.3 (23 Sep 2024 08:36), Max: 98.1 (22 Sep 2024 21:05)  HR: 75 (23 Sep 2024 08:36) (70 - 79)  BP: 110/71 (23 Sep 2024 08:36) (106/70 - 112/71)  BP(mean): --  RR: 16 (23 Sep 2024 08:36) (16 - 16)  SpO2: 94% (23 Sep 2024 08:36) (93% - 94%)    Parameters below as of 23 Sep 2024 08:36  Patient On (Oxygen Delivery Method): room air        Gen - NAD, Comfortable  HEENT - NCAT, EOMI, MMM, PERRLA, Normal Conjunctivae  Neck - Supple, No limited ROM  Pulm - CTAB, No wheeze, No rhonchi, No crackles  Cardiovascular - RRR, S1S2, No murmurs  Chest - good chest expansion, good respiratory effort  Abdomen - Soft, NT/ND, +BS  Extremities - No C/C, no calf tenderness, BLE 1+ edema  Neuro-     Cognitive - awake, alert, oriented to person, place, date, year, and situation.  Able  to follow command          Cranial Nerves -No facial asymmetry, Tongue midline, EOMI, Shoulder shrug intact     Motor - No focal deficits. Right /Left hemiparesis                    LEFT    UE - ShAB 5/5, EF 5/5, EE 5/5,  5/5                    RIGHT UE - ShAB 5/5, EF 5/5, EE 5/5,   5/5                    LEFT    LE - HF 5/5, KE 4/5, DF 5/5, PF 5/5                    RIGHT LE - HF 5/5, KE 4+/5, DF 5/5, PF 5/5        Sensory - Intact  to LT bilaterally, decreased sensation to RLE (chronic)     Tone - normal  MSK: BL knee soreness 0-110 ROM right knee , 5-110 Left knee AROM  Psychiatric - Mood stable, Affect WNL  Skin:  LUE bruising, BLE ecchymosis, [facility] TKR open to air with staples - incisions intact    IDT in [facility] 9/18  NM home with OPD PT- 9/27
Chief complaint- difficulty ambulating- SP [facility] TKR    JENELLE COLEMAN is a 65 yo male with PMHx of high cholesterol, primary OA of b/l knees,  and diverticulitis s/p colectomy who presented to [facility] for elective bilateral total knee replacement with failed conservative treatment with medications. Patient had B/L TKR on 9/10/2024 with Dr. Thierno Tan. Hospital course unremarkable and without complications. Patient optimized, pain is well controlled and was evaluated by PM&R and therapy for functional deficits, gait/ADL impairments and acute rehabilitation was recommended. Patient was cleared for discharge to [facility] IRU on 9/16/24.    ROS/subjective:  patient seen and evaluated in OT   persistent left knee pain- will xray, lidoderm to both knees during the day  good BMs  no dizziness, no headaches, no SOB, No chest pain, no nausea, no vomiting  tolerating increased gabapentin- no lethargy- reports less LE burning  to contact Ortho regarding Dc staples      MEDICATIONS  (STANDING):  acetaminophen     Tablet .. 975 milliGRAM(s) Oral every 6 hours  aspirin 325 milliGRAM(s) Oral two times a day  cholecalciferol 1000 Unit(s) Oral daily  cyanocobalamin 1000 MICROGram(s) Oral with breakfast  folic acid 1 milliGRAM(s) Oral daily  gabapentin 300 milliGRAM(s) Oral <User Schedule>  gabapentin 600 milliGRAM(s) Oral at bedtime  gemfibrozil 600 milliGRAM(s) Oral <User Schedule>  lidocaine   4% Patch 1 Patch Transdermal daily  pantoprazole    Tablet 40 milliGRAM(s) Oral before breakfast  polyethylene glycol 3350 17 Gram(s) Oral two times a day    MEDICATIONS  (PRN):  cyclobenzaprine 5 milliGRAM(s) Oral three times a day PRN Muscle Spasm  senna 2 Tablet(s) Oral at bedtime PRN Constipation                            10.2   6.55  )-----------( 306      ( 23 Sep 2024 06:30 )             32.2     09-23    137  |  101  |  22  ----------------------------<  97  4.4   |  29  |  1.00    Ca    9.2      23 Sep 2024 06:30    TPro  6.3  /  Alb  3.3  /  TBili  0.8  /  DBili  x   /  AST  26  /  ALT  36  /  AlkPhos  83  09-23    Urinalysis Basic - ( 23 Sep 2024 06:30 )    Color: x / Appearance: x / SG: x / pH: x  Gluc: 97 mg/dL / Ketone: x  / Bili: x / Urobili: x   Blood: x / Protein: x / Nitrite: x   Leuk Esterase: x / RBC: x / WBC x   Sq Epi: x / Non Sq Epi: x / Bacteria: x        CAPILLARY BLOOD GLUCOSE          Vital Signs Last 24 Hrs  T(C): 36.5 (24 Sep 2024 07:44), Max: 36.7 (23 Sep 2024 21:32)  T(F): 97.7 (24 Sep 2024 07:44), Max: 98.1 (23 Sep 2024 21:32)  HR: 69 (24 Sep 2024 07:44) (69 - 71)  BP: 109/68 (24 Sep 2024 07:44) (109/68 - 111/71)  BP(mean): --  RR: 16 (24 Sep 2024 07:44) (16 - 18)  SpO2: 97% (24 Sep 2024 07:44) (96% - 97%)    Parameters below as of 24 Sep 2024 07:44  Patient On (Oxygen Delivery Method): room air      Gen - NAD, Comfortable  HEENT - NCAT, EOMI, MMM, PERRLA, Normal Conjunctivae  Neck - Supple, No limited ROM  Pulm - CTAB, No wheeze, No rhonchi, No crackles  Cardiovascular - RRR, S1S2, No murmurs  Chest - good chest expansion, good respiratory effort  Abdomen - Soft, NT/ND, +BS  Extremities - No C/C, no calf tenderness, BLE 1+ edema  Neuro-     Cognitive - awake, alert, oriented to person, place, date, year, and situation.  Able  to follow command          Cranial Nerves -No facial asymmetry, Tongue midline, EOMI, Shoulder shrug intact     Motor - No focal deficits. Right /Left hemiparesis                    LEFT    UE - ShAB 5/5, EF 5/5, EE 5/5,  5/5                    RIGHT UE - ShAB 5/5, EF 5/5, EE 5/5,   5/5                    LEFT    LE - HF 5/5, KE 4/5, DF 5/5, PF 5/5                    RIGHT LE - HF 5/5, KE 4+/5, DF 5/5, PF 5/5        Sensory - Intact  to LT bilaterally, decreased sensation to RLE (chronic)     Tone - normal  MSK: BL knee soreness 0-110 ROM right knee , 5-110 Left knee AROM  Psychiatric - Mood stable, Affect WNL  Skin:  LUE bruising, BLE ecchymosis, Westley TKR open to air with staples - incisions intact    IDT in [facility] 9/18  [place] home with OPD PT- 9/27
PROGRESS NOTE:     Patient is a 64y old  Male who presents with a chief complaint of Bilateral Total knee replacement (17 Sep 2024 14:49)      SUBJECTIVE / OVERNIGHT EVENTS: pt was seen and evaluated today  no complains offered     ADDITIONAL REVIEW OF SYSTEMS:    MEDICATIONS  (STANDING):  acetaminophen     Tablet .. 975 milliGRAM(s) Oral every 6 hours  aspirin 325 milliGRAM(s) Oral two times a day  cholecalciferol 1000 Unit(s) Oral daily  gabapentin 600 milliGRAM(s) Oral at bedtime  gabapentin 300 milliGRAM(s) Oral <User Schedule>  gemfibrozil 600 milliGRAM(s) Oral <User Schedule>  pantoprazole    Tablet 40 milliGRAM(s) Oral before breakfast  polyethylene glycol 3350 17 Gram(s) Oral daily    MEDICATIONS  (PRN):  cyclobenzaprine 5 milliGRAM(s) Oral three times a day PRN Muscle Spasm  oxyCODONE    IR 5 milliGRAM(s) Oral every 6 hours PRN Moderate Pain (4 - 6)  oxyCODONE    IR 10 milliGRAM(s) Oral every 6 hours PRN Severe Pain (7 - 10)  senna 2 Tablet(s) Oral at bedtime PRN Constipation      CAPILLARY BLOOD GLUCOSE        I&O's Summary      PHYSICAL EXAM:  Vital Signs Last 24 Hrs  T(C): 36.7 (18 Sep 2024 08:37), Max: 36.7 (17 Sep 2024 19:50)  T(F): 98.1 (18 Sep 2024 08:37), Max: 98.1 (17 Sep 2024 19:50)  HR: 81 (18 Sep 2024 08:37) (81 - 82)  BP: 100/65 (18 Sep 2024 08:37) (100/65 - 104/66)  BP(mean): --  RR: 16 (18 Sep 2024 08:37) (16 - 16)  SpO2: 97% (18 Sep 2024 08:37) (95% - 97%)    Parameters below as of 18 Sep 2024 08:37  Patient On (Oxygen Delivery Method): room air      Gen - NAD, Comfortable  HEENT - NCAT, EOMI, MMM, PERRLA, Normal Conjunctivae  Neck - Supple, No limited ROM  Pulm - CTAB, No wheeze, No rhonchi, No crackles  Cardiovascular - RRR, S1S2, No murmurs  Chest - good chest expansion, good respiratory effort  Abdomen - Soft, NT/ND, +BS  Extremities - No C/C, no calf tenderness, BLE 1+ edema  Neuro- awake, alert, oriented to person, place, date, year, and situation.  Able  to follow command  Skin:  LUE bruising, BLE ecchymosis, mepilex BL knees, CDI, L buttock scab  pysch cooperative     LABS:                        9.5    5.79  )-----------( 227      ( 17 Sep 2024 05:40 )             28.7     09-17    142  |  104  |  17  ----------------------------<  100[H]  4.2   |  33[H]  |  0.85    Ca    9.2      17 Sep 2024 05:40    TPro  6.1  /  Alb  2.9[L]  /  TBili  1.0  /  DBili  x   /  AST  37  /  ALT  43  /  AlkPhos  69  09-17          Urinalysis Basic - ( 17 Sep 2024 05:40 )    Color: x / Appearance: x / SG: x / pH: x  Gluc: 100 mg/dL / Ketone: x  / Bili: x / Urobili: x   Blood: x / Protein: x / Nitrite: x   Leuk Esterase: x / RBC: x / WBC x   Sq Epi: x / Non Sq Epi: x / Bacteria: x          RADIOLOGY & ADDITIONAL TESTS:  Results Reviewed: y  Imaging Personally Reviewed:y  Electrocardiogram Personally Reviewed:y    COORDINATION OF CARE:  Care Discussed with Consultants/Other Providers [Y/N]:y  Prior or Outpatient Records Reviewed [Y/N]:[facility]  
Chief complaint- difficulty ambulating- SP [facility]ley TKR    JENELLE COLEMAN is a 63 yo male with PMHx of high cholesterol, primary OA of b/l knees,  and diverticulitis s/p colectomy who presented to [facility] for elective bilateral total knee replacement with failed conservative treatment with medications. Patient had B/L TKR on 9/10/2024 with Dr. Thierno Tan. Hospital course unremarkable and without complications. Patient optimized, pain is well controlled and was evaluated by PM&R and therapy for functional deficits, gait/ADL impairments and acute rehabilitation was recommended. Patient was cleared for discharge to [facility] IRU on 9/16/24.    ROS/subjective:  patient seen and evaluated bedside  Moved bowels but requesting Miralax 2x/day with prune juice  for shower  no overnight issues  Stiffness/pain in left knee improving  no dizziness, no headaches, no SOB, No chest pain, no nausea, no vomiting  tolerating increased gabapentin- no lethargy- reports less LE burning          MEDICATIONS  (STANDING):  acetaminophen     Tablet .. 975 milliGRAM(s) Oral every 6 hours  aspirin 325 milliGRAM(s) Oral two times a day  cholecalciferol 1000 Unit(s) Oral daily  gabapentin 600 milliGRAM(s) Oral at bedtime  gabapentin 300 milliGRAM(s) Oral <User Schedule>  gemfibrozil 600 milliGRAM(s) Oral <User Schedule>  pantoprazole    Tablet 40 milliGRAM(s) Oral before breakfast  polyethylene glycol 3350 17 Gram(s) Oral once  polyethylene glycol 3350 17 Gram(s) Oral two times a day    MEDICATIONS  (PRN):  cyclobenzaprine 5 milliGRAM(s) Oral three times a day PRN Muscle Spasm  oxyCODONE    IR 5 milliGRAM(s) Oral every 6 hours PRN Moderate Pain (4 - 6)  oxyCODONE    IR 10 milliGRAM(s) Oral every 6 hours PRN Severe Pain (7 - 10)  senna 2 Tablet(s) Oral at bedtime PRN Constipation                  CAPILLARY BLOOD GLUCOSE          Vital Signs Last 24 Hrs  T(C): 36.5 (21 Sep 2024 08:45), Max: 36.7 (20 Sep 2024 20:00)  T(F): 97.7 (21 Sep 2024 08:45), Max: 98.1 (20 Sep 2024 20:00)  HR: 75 (21 Sep 2024 08:45) (75 - 78)  BP: 113/74 (21 Sep 2024 08:45) (103/66 - 113/74)  BP(mean): --  RR: 15 (21 Sep 2024 08:45) (15 - 16)  SpO2: 94% (21 Sep 2024 08:45) (94% - 96%)    Parameters below as of 21 Sep 2024 08:45  Patient On (Oxygen Delivery Method): room air          Gen - NAD, Comfortable  HEENT - NCAT, EOMI, MMM, PERRLA, Normal Conjunctivae  Neck - Supple, No limited ROM  Pulm - CTAB, No wheeze, No rhonchi, No crackles  Cardiovascular - RRR, S1S2, No murmurs  Chest - good chest expansion, good respiratory effort  Abdomen - Soft, NT/ND, +BS  Extremities - No C/C, no calf tenderness, BLE 1+ edema  Neuro-     Cognitive - awake, alert, oriented to person, place, date, year, and situation.  Able  to follow command          Cranial Nerves -No facial asymmetry, Tongue midline, EOMI, Shoulder shrug intact     Motor - No focal deficits. Right /Left hemiparesis                    LEFT    UE - ShAB 5/5, EF 5/5, EE 5/5,  5/5                    RIGHT UE - ShAB 5/5, EF 5/5, EE 5/5,   5/5                    LEFT    LE - HF 5/5, KE 4/5, DF 5/5, PF 5/5                    RIGHT LE - HF 5/5, KE 4+/5, DF 5/5, PF 5/5        Sensory - Intact  to LT bilaterally, decreased sensation to RLE (chronic)     Tone - normal  MSK: BL knee soreness 0-110 ROM right knee , 5-110 Left knee AROM  Psychiatric - Mood stable, Affect WNL  Skin:  LUE bruising, BLE ecchymosis, Westley TKR open to air with staples - incisions intact    IDT in [facility] 9/18  [place] home with OPD PT- 9/27
HPI:  JENELLE COLEMAN is a 65 yo male with PMHx of high cholesterol, primary OA of b/l knees,  and diverticulitis s/p colectomy who presented to [facility] for elective bilateral total knee replacement with failed conservative treatment with medications. Patient had B/L TKR on 9/10/2024 with Dr. Thierno Tan. Hospital course unremarkable and without complications. Patient optimized, pain is well controlled and was evaluated by PM&R and therapy for functional deficits, gait/ADL impairments and acute rehabilitation was recommended. Patient was cleared for discharge to [facility] IRU on 9/16/24. (16 Sep 2024 12:55)      ROS/subjective:  patient seen and evaluated in bed, NAD  staples removed-healing well, XR knee ok  good BMs  no dizziness, no headaches, no SOB, No chest pain, no nausea, no vomiting  tolerating increased gabapentin- no lethargy- reports less LE burning  dc home today      MEDICATIONS  (STANDING):  acetaminophen     Tablet .. 975 milliGRAM(s) Oral every 6 hours  aspirin 325 milliGRAM(s) Oral two times a day  cholecalciferol 1000 Unit(s) Oral daily  cyanocobalamin 1000 MICROGram(s) Oral with breakfast  folic acid 1 milliGRAM(s) Oral daily  gabapentin 600 milliGRAM(s) Oral at bedtime  gabapentin 300 milliGRAM(s) Oral <User Schedule>  gemfibrozil 600 milliGRAM(s) Oral <User Schedule>  lidocaine   4% Patch 1 Patch Transdermal daily  pantoprazole    Tablet 40 milliGRAM(s) Oral before breakfast  polyethylene glycol 3350 17 Gram(s) Oral two times a day    MEDICATIONS  (PRN):  cyclobenzaprine 5 milliGRAM(s) Oral three times a day PRN Muscle Spasm  senna 2 Tablet(s) Oral at bedtime PRN Constipation                            10.9   5.93  )-----------( 321      ( 26 Sep 2024 06:58 )             34.5     09-26    141  |  104  |  20  ----------------------------<  98  4.7   |  30  |  0.89    Ca    9.3      26 Sep 2024 06:58    TPro  6.5  /  Alb  3.4  /  TBili  0.8  /  DBili  x   /  AST  18  /  ALT  30  /  AlkPhos  84  09-26    Urinalysis Basic - ( 26 Sep 2024 06:58 )    Color: x / Appearance: x / SG: x / pH: x  Gluc: 98 mg/dL / Ketone: x  / Bili: x / Urobili: x   Blood: x / Protein: x / Nitrite: x   Leuk Esterase: x / RBC: x / WBC x   Sq Epi: x / Non Sq Epi: x / Bacteria: x        Vital Signs Last 24 Hrs  T(C): 36.9 (27 Sep 2024 08:49), Max: 36.9 (27 Sep 2024 08:49)  T(F): 98.4 (27 Sep 2024 08:49), Max: 98.4 (27 Sep 2024 08:49)  HR: 71 (27 Sep 2024 08:49) (71 - 80)  BP: 114/68 (27 Sep 2024 08:49) (111/73 - 114/68)  BP(mean): --  RR: 16 (27 Sep 2024 08:49) (16 - 16)  SpO2: 97% (27 Sep 2024 08:49) (96% - 97%)    Parameters below as of 27 Sep 2024 08:49  Patient On (Oxygen Delivery Method): room air      Gen - NAD, Comfortable  HEENT - NCAT, EOMI, MMM, PERRLA, Normal Conjunctivae  Neck - Supple, No limited ROM  Pulm - CTAB, No wheeze, No rhonchi, No crackles  Cardiovascular - RRR, S1S2, No murmurs  Chest - good chest expansion, good respiratory effort  Abdomen - Soft, NT/ND, +BS  Extremities - No C/C, no calf tenderness, BLE 1+ edema  Neuro-     Cognitive - awake, alert, oriented to person, place, date, year, and situation.  Able  to follow command          Cranial Nerves - intact     Motor - No focal deficits                    LEFT    UE - ShAB 5/5, EF 5/5, EE 5/5,  5/5                    RIGHT UE - ShAB 5/5, EF 5/5, EE 5/5,   5/5                    LEFT    LE - HF 5/5, KE 4/5, DF 5/5, PF 5/5                    RIGHT LE - HF 5/5, KE 4+/5, DF 5/5, PF 5/5        Sensory - Intact  to LT      Tone - normal  Psychiatric - Mood stable, Affect WNL  Skin:  LUE bruising, BLE ecchymosis, Westley TKR open to air with staples - incisions intact    IDT in [facility] 9/18  [place] home with OPD PT- 9/27        completed comprehensive acute rehab program consisting of 3hrs/day of OT/PT.
Patient is a 64y old  Male who presents with a chief complaint of Bilateral Total knee replacement (19 Sep 2024 13:36)      Subjective and overnight events:  Patient seen and examined at bedside. reports L knee pain > R knee, however, pain in general controlled. no fever, chills, sob, cp, abd pain.     ALLERGIES:  SEASONAL ALLERGIES - STUFFY NOSE - USES LORATIDINE AND FLONASE DAILY (Other)  No Known Drug Allergies    MEDICATIONS  (STANDING):  acetaminophen     Tablet .. 975 milliGRAM(s) Oral every 6 hours  aspirin 325 milliGRAM(s) Oral two times a day  cholecalciferol 1000 Unit(s) Oral daily  gabapentin 600 milliGRAM(s) Oral at bedtime  gabapentin 300 milliGRAM(s) Oral <User Schedule>  gemfibrozil 600 milliGRAM(s) Oral <User Schedule>  pantoprazole    Tablet 40 milliGRAM(s) Oral before breakfast  polyethylene glycol 3350 17 Gram(s) Oral daily    MEDICATIONS  (PRN):  cyclobenzaprine 5 milliGRAM(s) Oral three times a day PRN Muscle Spasm  oxyCODONE    IR 10 milliGRAM(s) Oral every 6 hours PRN Severe Pain (7 - 10)  oxyCODONE    IR 5 milliGRAM(s) Oral every 6 hours PRN Moderate Pain (4 - 6)  senna 2 Tablet(s) Oral at bedtime PRN Constipation    Vital Signs Last 24 Hrs  T(F): 98.1 (20 Sep 2024 07:37), Max: 98.1 (20 Sep 2024 07:37)  HR: 74 (20 Sep 2024 07:37) (74 - 85)  BP: 102/65 (20 Sep 2024 07:37) (102/65 - 134/83)  RR: 16 (20 Sep 2024 07:37) (15 - 16)  SpO2: 93% (20 Sep 2024 07:37) (93% - 97%)  I&O's Summary    19 Sep 2024 07:01  -  20 Sep 2024 07:00  --------------------------------------------------------  IN: 0 mL / OUT: 500 mL / NET: -500 mL      PHYSICAL EXAM:  General: NAD, A/O x 3  ENT: MMM  Neck: Supple, No JVD  Lungs: Clear to auscultation bilaterally  Cardio: RRR, S1/S2, No murmurs  Abdomen: Soft, Nontender, Nondistended; Bowel sounds present  Extremities: No calf tenderness, No pitting edema. bilateral knee incisions, c/d/i. mild bilateral knee swelling     LABS:                        10.1   7.09  )-----------( 272      ( 19 Sep 2024 07:22 )             30.2     09-19    139  |  103  |  20  ----------------------------<  101  4.4   |  30  |  0.88    Ca    9.1      19 Sep 2024 07:22    TPro  6.2  /  Alb  3.1  /  TBili  1.1  /  DBili  x   /  AST  30  /  ALT  58  /  AlkPhos  83  09-19      Urinalysis Basic - ( 19 Sep 2024 07:22 )    Color: x / Appearance: x / SG: x / pH: x  Gluc: 101 mg/dL / Ketone: x  / Bili: x / Urobili: x   Blood: x / Protein: x / Nitrite: x   Leuk Esterase: x / RBC: x / WBC x   Sq Epi: x / Non Sq Epi: x / Bacteria: x        COVID-19 PCR: NotDetec (09-16-24 @ 18:20)      RADIOLOGY & ADDITIONAL TESTS:    Care Discussed with Consultants/Other Providers:   
Patient is a 64y old  Male who presents with a chief complaint of Bilateral Total knee replacement (23 Sep 2024 13:00)      Patient seen and examined at bedside. feeling well. denies headache, fever, chills, cp, sob, n/v, abd pain.     ALLERGIES:  SEASONAL ALLERGIES - STUFFY NOSE - USES LORATIDINE AND FLONASE DAILY (Other)  No Known Drug Allergies    MEDICATIONS  (STANDING):  acetaminophen     Tablet .. 975 milliGRAM(s) Oral every 6 hours  aspirin 325 milliGRAM(s) Oral two times a day  cholecalciferol 1000 Unit(s) Oral daily  cyanocobalamin 1000 MICROGram(s) Oral with breakfast  folic acid 1 milliGRAM(s) Oral daily  gabapentin 300 milliGRAM(s) Oral <User Schedule>  gabapentin 600 milliGRAM(s) Oral at bedtime  gemfibrozil 600 milliGRAM(s) Oral <User Schedule>  lidocaine   4% Patch 1 Patch Transdermal daily  pantoprazole    Tablet 40 milliGRAM(s) Oral before breakfast  polyethylene glycol 3350 17 Gram(s) Oral two times a day    MEDICATIONS  (PRN):  cyclobenzaprine 5 milliGRAM(s) Oral three times a day PRN Muscle Spasm  senna 2 Tablet(s) Oral at bedtime PRN Constipation    Vital Signs Last 24 Hrs  T(F): 97.7 (24 Sep 2024 07:44), Max: 98.1 (23 Sep 2024 21:32)  HR: 71 (24 Sep 2024 07:44) (69 - 71)  BP: 109/68 (24 Sep 2024 07:44) (109/68 - 111/71)  RR: 16 (24 Sep 2024 07:44) (16 - 18)  SpO2: 97% (24 Sep 2024 07:44) (96% - 97%)  I&O's Summary    23 Sep 2024 07:01  -  24 Sep 2024 07:00  --------------------------------------------------------  IN: 0 mL / OUT: 1475 mL / NET: -1475 mL          PHYSICAL EXAM:  General: NAD, A/O x 3  ENT: MMM, no scleral icterus  Neck: Supple, No JVD  Lungs: Clear to auscultation bilaterally, no wheezes, rales, rhonchi  Cardio: RRR, S1/S2  Abdomen: Soft, Nontender, Nondistended; Bowel sounds present  Extremities: No calf tenderness, No pitting edema. b/l knee surgical incision healing well    LABS:                        10.2   6.55  )-----------( 306      ( 23 Sep 2024 06:30 )             32.2       09-23    137  |  101  |  22  ----------------------------<  97  4.4   |  29  |  1.00    Ca    9.2      23 Sep 2024 06:30    TPro  6.3  /  Alb  3.3  /  TBili  0.8  /  DBili  x   /  AST  26  /  ALT  36  /  AlkPhos  83  09-23                                  Urinalysis Basic - ( 23 Sep 2024 06:30 )    Color: x / Appearance: x / SG: x / pH: x  Gluc: 97 mg/dL / Ketone: x  / Bili: x / Urobili: x   Blood: x / Protein: x / Nitrite: x   Leuk Esterase: x / RBC: x / WBC x   Sq Epi: x / Non Sq Epi: x / Bacteria: x        COVID-19 PCR: NotDetec (09-16-24 @ 18:20)      RADIOLOGY & ADDITIONAL TESTS:    Care Discussed with Consultants/Other Providers: yes, rehab  
    Patient seen and examined at bedside. no complaints.      ALLERGIES:  SEASONAL ALLERGIES - STUFFY NOSE - USES LORATIDINE AND FLONASE DAILY (Other)  No Known Drug Allergies    MEDICATIONS  (STANDING):  acetaminophen     Tablet .. 975 milliGRAM(s) Oral every 6 hours  aspirin 325 milliGRAM(s) Oral two times a day  cholecalciferol 1000 Unit(s) Oral daily  gabapentin 600 milliGRAM(s) Oral at bedtime  gabapentin 300 milliGRAM(s) Oral <User Schedule>  gemfibrozil 600 milliGRAM(s) Oral <User Schedule>  pantoprazole    Tablet 40 milliGRAM(s) Oral before breakfast  polyethylene glycol 3350 17 Gram(s) Oral daily    MEDICATIONS  (PRN):  cyclobenzaprine 5 milliGRAM(s) Oral three times a day PRN Muscle Spasm  oxyCODONE    IR 5 milliGRAM(s) Oral every 6 hours PRN Moderate Pain (4 - 6)  oxyCODONE    IR 10 milliGRAM(s) Oral every 6 hours PRN Severe Pain (7 - 10)  senna 2 Tablet(s) Oral at bedtime PRN Constipation    Vital Signs Last 24 Hrs  T(F): 97.7 (21 Sep 2024 08:45), Max: 98.1 (20 Sep 2024 20:00)  HR: 75 (21 Sep 2024 08:45) (75 - 78)  BP: 113/74 (21 Sep 2024 08:45) (103/66 - 113/74)  RR: 15 (21 Sep 2024 08:45) (15 - 16)  SpO2: 94% (21 Sep 2024 08:45) (94% - 96%)  I&O's Summary    BMI (kg/m2): 35.4 (09-16-24 @ 18:24)  PHYSICAL EXAM:    Gen: nad, resting in bed  Neuro: aaox3, no focal deficits  Heent: eomi b/l, no jvd, no oral exudates  Pulm: cta b/l, no w/r/r  CV: +s1s2, no m/r/g  Ab: soft, nt/nd, normoactive bs x 4  Extrem: no edema, pulses intact and equal  Skin: no rashes  Psych: normal    LABS:                        10.1   7.09  )-----------( 272      ( 19 Sep 2024 07:22 )             30.2       09-19    139  |  103  |  20  ----------------------------<  101  4.4   |  30  |  0.88    Ca    9.1      19 Sep 2024 07:22    TPro  6.2  /  Alb  3.1  /  TBili  1.1  /  DBili  x   /  AST  30  /  ALT  58  /  AlkPhos  83  09-19         Urinalysis Basic - ( 19 Sep 2024 07:22 )    Color: x / Appearance: x / SG: x / pH: x  Gluc: 101 mg/dL / Ketone: x  / Bili: x / Urobili: x   Blood: x / Protein: x / Nitrite: x   Leuk Esterase: x / RBC: x / WBC x   Sq Epi: x / Non Sq Epi: x / Bacteria: x        COVID-19 PCR: [place] (09-16-24 @ 18:20)      RADIOLOGY & ADDITIONAL TESTS:    Care Discussed with Consultants/Other Providers:   
Chief complaint- difficulty ambulating- SP [facility] TKR    JENELLE COLEMAN is a 65 yo male with PMHx of high cholesterol, primary OA of b/l knees,  and diverticulitis s/p colectomy who presented to [facility] for elective bilateral total knee replacement with failed conservative treatment with medications. Patient had B/L TKR on 9/10/2024 with Dr. Thierno Tan. Hospital course unremarkable and without complications. Patient optimized, pain is well controlled and was evaluated by PM&R and therapy for functional deficits, gait/ADL impairments and acute rehabilitation was recommended. Patient was cleared for discharge to [facility] IRU on 9/16/24.    ROS/subjective:  patient seen and evaluated bedside  no overnight events  BMx 2 yesterday  took shower  Stiffness/pain in left knee improving  no dizziness, no headaches, no SOB, No chest pain, no nausea, no vomiting  tolerating increased gabapentin- no lethargy- reports less LE burning          MEDICATIONS  (STANDING):  acetaminophen     Tablet .. 975 milliGRAM(s) Oral every 6 hours  aspirin 325 milliGRAM(s) Oral two times a day  cholecalciferol 1000 Unit(s) Oral daily  gabapentin 600 milliGRAM(s) Oral at bedtime  gabapentin 300 milliGRAM(s) Oral <User Schedule>  gemfibrozil 600 milliGRAM(s) Oral <User Schedule>  pantoprazole    Tablet 40 milliGRAM(s) Oral before breakfast  polyethylene glycol 3350 17 Gram(s) Oral once  polyethylene glycol 3350 17 Gram(s) Oral two times a day    MEDICATIONS  (PRN):  cyclobenzaprine 5 milliGRAM(s) Oral three times a day PRN Muscle Spasm  oxyCODONE    IR 5 milliGRAM(s) Oral every 6 hours PRN Moderate Pain (4 - 6)  oxyCODONE    IR 10 milliGRAM(s) Oral every 6 hours PRN Severe Pain (7 - 10)  senna 2 Tablet(s) Oral at bedtime PRN Constipation                  CAPILLARY BLOOD GLUCOSE          Vital Signs Last 24 Hrs  T(C): 36.7 (22 Sep 2024 08:47), Max: 36.7 (22 Sep 2024 08:47)  T(F): 98 (22 Sep 2024 08:47), Max: 98 (22 Sep 2024 08:47)  HR: 72 (22 Sep 2024 08:47) (72 - 78)  BP: 130/79 (22 Sep 2024 08:47) (104/68 - 130/79)  BP(mean): --  RR: 15 (22 Sep 2024 08:47) (15 - 16)  SpO2: 96% (22 Sep 2024 08:47) (96% - 96%)    Parameters below as of 22 Sep 2024 08:47  Patient On (Oxygen Delivery Method): room air            Gen - NAD, Comfortable  HEENT - NCAT, EOMI, MMM, PERRLA, Normal Conjunctivae  Neck - Supple, No limited ROM  Pulm - CTAB, No wheeze, No rhonchi, No crackles  Cardiovascular - RRR, S1S2, No murmurs  Chest - good chest expansion, good respiratory effort  Abdomen - Soft, NT/ND, +BS  Extremities - No C/C, no calf tenderness, BLE 1+ edema  Neuro-     Cognitive - awake, alert, oriented to person, place, date, year, and situation.  Able  to follow command          Cranial Nerves -No facial asymmetry, Tongue midline, EOMI, Shoulder shrug intact     Motor - No focal deficits. Right /Left hemiparesis                    LEFT    UE - ShAB 5/5, EF 5/5, EE 5/5,  5/5                    RIGHT UE - ShAB 5/5, EF 5/5, EE 5/5,   5/5                    LEFT    LE - HF 5/5, KE 4/5, DF 5/5, PF 5/5                    RIGHT LE - HF 5/5, KE 4+/5, DF 5/5, PF 5/5        Sensory - Intact  to LT bilaterally, decreased sensation to RLE (chronic)     Tone - normal  MSK: BL knee soreness 0-110 ROM right knee , 5-110 Left knee AROM  Psychiatric - Mood stable, Affect WNL  Skin:  LUE bruising, BLE ecchymosis, Westley TKR open to air with staples - incisions intact    IDT in [facility] 9/18  [place] home with OPD PT- 9/27
HPI:  JENELLE COLEMAN is a 65 yo male with PMHx of high cholesterol, primary OA of b/l knees,  and diverticulitis s/p colectomy who presented to [facility] for elective bilateral total knee replacement with failed conservative treatment with medications. Patient had B/L TKR on 9/10/2024 with Dr. Thierno Tan. Hospital course unremarkable and without complications. Patient optimized, pain is well controlled and was evaluated by PM&R and therapy for functional deficits, gait/ADL impairments and acute rehabilitation was recommended. Patient was cleared for discharge to [facility] IRU on 9/16/24. (16 Sep 2024 12:55)      ROS/subjective:  patient seen and evaluated in OT   persistent left knee pain- will xray, lidoderm to both knees during the day  good BMs  no dizziness, no headaches, no SOB, No chest pain, no nausea, no vomiting  tolerating increased gabapentin- no lethargy- reports less LE burning  to contact Ortho regarding Dc staples- awaiting call back      MEDICATIONS  (STANDING):  acetaminophen     Tablet .. 975 milliGRAM(s) Oral every 6 hours  aspirin 325 milliGRAM(s) Oral two times a day  cholecalciferol 1000 Unit(s) Oral daily  cyanocobalamin 1000 MICROGram(s) Oral with breakfast  folic acid 1 milliGRAM(s) Oral daily  gabapentin 600 milliGRAM(s) Oral at bedtime  gabapentin 300 milliGRAM(s) Oral <User Schedule>  gemfibrozil 600 milliGRAM(s) Oral <User Schedule>  lidocaine   4% Patch 1 Patch Transdermal daily  pantoprazole    Tablet 40 milliGRAM(s) Oral before breakfast  polyethylene glycol 3350 17 Gram(s) Oral two times a day    MEDICATIONS  (PRN):  cyclobenzaprine 5 milliGRAM(s) Oral three times a day PRN Muscle Spasm  senna 2 Tablet(s) Oral at bedtime PRN Constipation        Vital Signs Last 24 Hrs  T(C): 36.6 (25 Sep 2024 09:22), Max: 36.6 (25 Sep 2024 09:22)  T(F): 97.9 (25 Sep 2024 09:22), Max: 97.9 (25 Sep 2024 09:22)  HR: 69 (25 Sep 2024 09:22) (63 - 71)  BP: 118/78 (25 Sep 2024 09:22) (111/73 - 120/74)  BP(mean): --  RR: 16 (25 Sep 2024 09:22) (16 - 17)  SpO2: 97% (25 Sep 2024 09:22) (97% - 97%)    Parameters below as of 25 Sep 2024 09:22  Patient On (Oxygen Delivery Method): room air      Gen - NAD, Comfortable  HEENT - NCAT, EOMI, MMM, PERRLA, Normal Conjunctivae  Neck - Supple, No limited ROM  Pulm - CTAB, No wheeze, No rhonchi, No crackles  Cardiovascular - RRR, S1S2, No murmurs  Chest - good chest expansion, good respiratory effort  Abdomen - Soft, NT/ND, +BS  Extremities - No C/C, no calf tenderness, BLE 1+ edema  Neuro-     Cognitive - awake, alert, oriented to person, place, date, year, and situation.  Able  to follow command          Cranial Nerves -No facial asymmetry, Tongue midline, EOMI, Shoulder shrug intact     Motor - No focal deficits. Right /Left hemiparesis                    LEFT    UE - ShAB 5/5, EF 5/5, EE 5/5,  5/5                    RIGHT UE - ShAB 5/5, EF 5/5, EE 5/5,   5/5                    LEFT    LE - HF 5/5, KE 4/5, DF 5/5, PF 5/5                    RIGHT LE - HF 5/5, KE 4+/5, DF 5/5, PF 5/5        Sensory - Intact  to LT bilaterally, decreased sensation to RLE (chronic)     Tone - normal  MSK: BL knee soreness 0-110 ROM right knee , 5-110 Left knee AROM  Psychiatric - Mood stable, Affect WNL  Skin:  LUE bruising, BLE ecchymosis, Westley TKR open to air with staples - incisions intact    IDT in [facility] 9/18  DC home with OPD PT- 9/27      Continue comprehensive acute rehab program consisting of 3hrs/day of OT/PT.
HPI:  JENELLE COLEMAN is a 65 yo male with PMHx of high cholesterol, primary OA of b/l knees,  and diverticulitis s/p colectomy who presented to [facility] for elective bilateral total knee replacement with failed conservative treatment with medications. Patient had B/L TKR on 9/10/2024 with Dr. Thierno Tan. Hospital course unremarkable and without complications. Patient optimized, pain is well controlled and was evaluated by PM&R and therapy for functional deficits, gait/ADL impairments and acute rehabilitation was recommended. Patient was cleared for discharge to [facility] IRU on 9/16/24.       ROS/subjective:  patient seen and evaluated in OT/PT  persistent left knee pain- XR knee ok  good BMs  no dizziness, no headaches, no SOB, No chest pain, no nausea, no vomiting  tolerating increased gabapentin- no lethargy- reports less LE burning  to contact Ortho regarding Dc staples- awaiting call back      MEDICATIONS  (STANDING):  acetaminophen     Tablet .. 975 milliGRAM(s) Oral every 6 hours  aspirin 325 milliGRAM(s) Oral two times a day  cholecalciferol 1000 Unit(s) Oral daily  cyanocobalamin 1000 MICROGram(s) Oral with breakfast  folic acid 1 milliGRAM(s) Oral daily  gabapentin 600 milliGRAM(s) Oral at bedtime  gabapentin 300 milliGRAM(s) Oral <User Schedule>  gemfibrozil 600 milliGRAM(s) Oral <User Schedule>  lidocaine   4% Patch 1 Patch Transdermal daily  pantoprazole    Tablet 40 milliGRAM(s) Oral before breakfast  polyethylene glycol 3350 17 Gram(s) Oral two times a day    MEDICATIONS  (PRN):  cyclobenzaprine 5 milliGRAM(s) Oral three times a day PRN Muscle Spasm  senna 2 Tablet(s) Oral at bedtime PRN Constipation                            10.9   5.93  )-----------( 321      ( 26 Sep 2024 06:58 )             34.5     09-26    141  |  104  |  20  ----------------------------<  98  4.7   |  30  |  0.89    Ca    9.3      26 Sep 2024 06:58    TPro  6.5  /  Alb  3.4  /  TBili  0.8  /  DBili  x   /  AST  18  /  ALT  30  /  AlkPhos  84  09-26    Urinalysis Basic - ( 26 Sep 2024 06:58 )    Color: x / Appearance: x / SG: x / pH: x  Gluc: 98 mg/dL / Ketone: x  / Bili: x / Urobili: x   Blood: x / Protein: x / Nitrite: x   Leuk Esterase: x / RBC: x / WBC x   Sq Epi: x / Non Sq Epi: x / Bacteria: x        Vital Signs Last 24 Hrs  T(C): 36.6 (26 Sep 2024 08:09), Max: 36.6 (26 Sep 2024 08:09)  T(F): 97.9 (26 Sep 2024 08:09), Max: 97.9 (26 Sep 2024 08:09)  HR: 85 (26 Sep 2024 08:09) (75 - 85)  BP: 120/76 (26 Sep 2024 08:09) (99/64 - 120/76)  BP(mean): 85 (26 Sep 2024 05:40) (76 - 85)  RR: 16 (26 Sep 2024 08:09) (16 - 16)  SpO2: 95% (26 Sep 2024 08:09) (95% - 95%)    Parameters below as of 26 Sep 2024 08:09  Patient On (Oxygen Delivery Method): room air        Gen - NAD, Comfortable  HEENT - NCAT, EOMI, MMM, PERRLA, Normal Conjunctivae  Neck - Supple, No limited ROM  Pulm - CTAB, No wheeze, No rhonchi, No crackles  Cardiovascular - RRR, S1S2, No murmurs  Chest - good chest expansion, good respiratory effort  Abdomen - Soft, NT/ND, +BS  Extremities - No C/C, no calf tenderness, BLE 1+ edema  Neuro-     Cognitive - awake, alert, oriented to person, place, date, year, and situation.  Able  to follow command          Cranial Nerves - intact     Motor - No focal deficits                    LEFT    UE - ShAB 5/5, EF 5/5, EE 5/5,  5/5                    RIGHT UE - ShAB 5/5, EF 5/5, EE 5/5,   5/5                    LEFT    LE - HF 5/5, KE 4/5, DF 5/5, PF 5/5                    RIGHT LE - HF 5/5, KE 4+/5, DF 5/5, PF 5/5        Sensory - Intact  to LT      Tone - normal  Psychiatric - Mood stable, Affect WNL  Skin:  LUE bruising, BLE ecchymosis, Westley TKR open to air with staples - incisions intact    IDT in [facility] 9/18  [place] home with OPD PT- 9/27      Continue comprehensive acute rehab program consisting of 3hrs/day of OT/PT and SLP.
Patient seen and examined at bedside. no complaints.      ALLERGIES:  SEASONAL ALLERGIES - STUFFY NOSE - USES LORATIDINE AND FLONASE DAILY (Other)  No Known Drug Allergies    MEDICATIONS  (STANDING):  acetaminophen     Tablet .. 975 milliGRAM(s) Oral every 6 hours  aspirin 325 milliGRAM(s) Oral two times a day  cholecalciferol 1000 Unit(s) Oral daily  gabapentin 600 milliGRAM(s) Oral at bedtime  gabapentin 300 milliGRAM(s) Oral <User Schedule>  gemfibrozil 600 milliGRAM(s) Oral <User Schedule>  pantoprazole    Tablet 40 milliGRAM(s) Oral before breakfast  polyethylene glycol 3350 17 Gram(s) Oral two times a day    MEDICATIONS  (PRN):  cyclobenzaprine 5 milliGRAM(s) Oral three times a day PRN Muscle Spasm  oxyCODONE    IR 5 milliGRAM(s) Oral every 6 hours PRN Moderate Pain (4 - 6)  oxyCODONE    IR 10 milliGRAM(s) Oral every 6 hours PRN Severe Pain (7 - 10)  senna 2 Tablet(s) Oral at bedtime PRN Constipation    Vital Signs Last 24 Hrs  T(F): 98 (22 Sep 2024 08:47), Max: 98 (22 Sep 2024 08:47)  HR: 72 (22 Sep 2024 08:47) (72 - 78)  BP: 130/79 (22 Sep 2024 08:47) (104/68 - 130/79)  RR: 15 (22 Sep 2024 08:47) (15 - 16)  SpO2: 96% (22 Sep 2024 08:47) (96% - 96%)  I&O's Summary      PHYSICAL EXAM:    Gen: nad, resting in bed  Neuro: aaox3, no focal deficits  Heent: eomi b/l, no jvd, no oral exudates  Pulm: cta b/l, no w/r/r  CV: +s1s2, no m/r/g  Ab: soft, nt/nd, normoactive bs x 4  Extrem: no edema, pulses intact and equal  Skin: no rashes  Psych: normal    LABS:      COVID-19 PCR: NotDetec (09-16-24 @ 18:20)      RADIOLOGY & ADDITIONAL TESTS:    Care Discussed with Consultants/Other Providers:   
Patient is a 64y old  Male who presents with a chief complaint of Bilateral Total knee replacement (24 Sep 2024 14:23)      Patient seen and examined at bedside. feeling well, some b/l soreness after therapies. otherwise denies headache, fever, chills, cp, sob, n/v, abd pain.     ALLERGIES:  SEASONAL ALLERGIES - STUFFY NOSE - USES LORATIDINE AND FLONASE DAILY (Other)  No Known Drug Allergies    MEDICATIONS  (STANDING):  acetaminophen     Tablet .. 975 milliGRAM(s) Oral every 6 hours  aspirin 325 milliGRAM(s) Oral two times a day  cholecalciferol 1000 Unit(s) Oral daily  cyanocobalamin 1000 MICROGram(s) Oral with breakfast  folic acid 1 milliGRAM(s) Oral daily  gabapentin 300 milliGRAM(s) Oral <User Schedule>  gabapentin 600 milliGRAM(s) Oral at bedtime  gemfibrozil 600 milliGRAM(s) Oral <User Schedule>  lidocaine   4% Patch 1 Patch Transdermal daily  pantoprazole    Tablet 40 milliGRAM(s) Oral before breakfast  polyethylene glycol 3350 17 Gram(s) Oral two times a day    MEDICATIONS  (PRN):  cyclobenzaprine 5 milliGRAM(s) Oral three times a day PRN Muscle Spasm  senna 2 Tablet(s) Oral at bedtime PRN Constipation    Vital Signs Last 24 Hrs  T(F): 97.9 (25 Sep 2024 09:22), Max: 97.9 (25 Sep 2024 09:22)  HR: 69 (25 Sep 2024 09:22) (63 - 71)  BP: 118/78 (25 Sep 2024 09:22) (111/73 - 120/74)  RR: 16 (25 Sep 2024 09:22) (16 - 17)  SpO2: 97% (25 Sep 2024 09:22) (97% - 97%)  I&O's Summary    24 Sep 2024 07:01  -  25 Sep 2024 07:00  --------------------------------------------------------  IN: 0 mL / OUT: 850 mL / NET: -850 mL          PHYSICAL EXAM:  General: NAD, A/O x 3  ENT: MMM, no scleral icterus  Neck: Supple, No JVD  Lungs: Clear to auscultation bilaterally, no wheezes, rales, rhonchi  Cardio: RRR, S1/S2  Abdomen: Soft, Nontender, Nondistended; Bowel sounds present  Extremities: No calf tenderness, No pitting edema. b/l knee surgical incision healing well      LABS:                        10.2   6.55  )-----------( 306      ( 23 Sep 2024 06:30 )             32.2       09-23    137  |  101  |  22  ----------------------------<  97  4.4   |  29  |  1.00    Ca    9.2      23 Sep 2024 06:30    TPro  6.3  /  Alb  3.3  /  TBili  0.8  /  DBili  x   /  AST  26  /  ALT  36  /  AlkPhos  83  09-23                                  Urinalysis Basic - ( 23 Sep 2024 06:30 )    Color: x / Appearance: x / SG: x / pH: x  Gluc: 97 mg/dL / Ketone: x  / Bili: x / Urobili: x   Blood: x / Protein: x / Nitrite: x   Leuk Esterase: x / RBC: x / WBC x   Sq Epi: x / Non Sq Epi: x / Bacteria: x        COVID-19 PCR: NotDetec (09-16-24 @ 18:20)      RADIOLOGY & ADDITIONAL TESTS:    Care Discussed with Consultants/Other Providers: yes, rehab  
Patient seen and examined at bedside. no new  complaints. Pain well managed      ALLERGIES:  SEASONAL ALLERGIES - STUFFY NOSE - USES LORATIDINE AND FLONASE DAILY (Other)  No Known Drug Allergies    MEDICATIONS  (STANDING):  acetaminophen     Tablet .. 975 milliGRAM(s) Oral every 6 hours  aspirin 325 milliGRAM(s) Oral two times a day  cholecalciferol 1000 Unit(s) Oral daily  cyanocobalamin 1000 MICROGram(s) Oral with breakfast  folic acid 1 milliGRAM(s) Oral daily  gabapentin 300 milliGRAM(s) Oral <User Schedule>  gabapentin 600 milliGRAM(s) Oral at bedtime  gemfibrozil 600 milliGRAM(s) Oral <User Schedule>  pantoprazole    Tablet 40 milliGRAM(s) Oral before breakfast  polyethylene glycol 3350 17 Gram(s) Oral two times a day    MEDICATIONS  (PRN):  cyclobenzaprine 5 milliGRAM(s) Oral three times a day PRN Muscle Spasm  oxyCODONE    IR 10 milliGRAM(s) Oral every 6 hours PRN Severe Pain (7 - 10)  oxyCODONE    IR 5 milliGRAM(s) Oral every 6 hours PRN Moderate Pain (4 - 6)  senna 2 Tablet(s) Oral at bedtime PRN Constipation    T(C): 36.3 (09-23-24 @ 08:36), Max: 36.7 (09-22-24 @ 08:47)  T(F): 97.3 (09-23-24 @ 08:36), Max: 98.1 (09-22-24 @ 21:05)  HR: 75 (09-23-24 @ 08:36) (70 - 79)  BP: 110/71 (09-23-24 @ 08:36) (106/70 - 130/79)  ABP: --  ABP(mean): --  RR: 16 (09-23-24 @ 08:36) (15 - 16)  SpO2: 94% (09-23-24 @ 08:36) (93% - 96%)    PHYSICAL EXAM:    Gen: nad, resting in bed  Neuro: aaox3, no focal deficits  Heent: eomi b/l, no jvd, no oral exudates  Pulm: cta b/l, no w/r/r  CV: +s1s2, no m/r/g  Ab: soft, nt/nd, normoactive bs x 4  Extrem: no edema, pulses intact and equal  Skin: no rashes  Psych: normal    LABS:                        10.2   6.55  )-----------( 306      ( 23 Sep 2024 06:30 )             32.2                CAPILLARY BLOOD GLUCOSE                RADIOLOGY & ADDITIONAL TESTS:    Consultant(s) Notes Reviewed:  [x ] YES  [ ] NO  Care Discussed with Consultants/Other Providers [ x] YES  [ ] NO  Imaging Personally Reviewed:  [x ] YES  [ ] NO

## 2024-09-27 NOTE — PROGRESS NOTE ADULT - REASON FOR ADMISSION
Bilateral Total knee replacement

## 2024-09-27 NOTE — PROGRESS NOTE ADULT - NS ATTEND AMEND GEN_ALL_CORE FT
Rehab Attending- Patient seen and examined by me - Case discussed, above note reviewed by me with modifications made    patient seen and evaluated bedside  staple out Both LES- Incisions intact  some pain anterolateral aspect of left knee but expected  to see Ortho 10/17  continue ASA 2x/day till 10/23  ready for Dc - Dc home    30 MINUTES SPENT TODAY ON PATIENT'S DISCHARGE    Vital Signs Last 24 Hrs  T(C): 36.9 (27 Sep 2024 08:49), Max: 36.9 (27 Sep 2024 08:49)  T(F): 98.4 (27 Sep 2024 08:49), Max: 98.4 (27 Sep 2024 08:49)  HR: 71 (27 Sep 2024 08:49) (71 - 80)  BP: 114/68 (27 Sep 2024 08:49) (111/73 - 114/68)  BP(mean): --  RR: 16 (27 Sep 2024 08:49) (16 - 16)  SpO2: 97% (27 Sep 2024 08:49) (96% - 97%)    Parameters below as of 27 Sep 2024 08:49  Patient On (Oxygen Delivery Method): room air      Gen - NAD, Comfortable  HEENT - NCAT, EOMI, MMM, PERRLA, Normal Conjunctivae  Neck - Supple, No limited ROM  Pulm - CTAB, No wheeze, No rhonchi, No crackles  Cardiovascular - RRR, S1S2, No murmurs  Chest - good chest expansion, good respiratory effort  Abdomen - Soft, NT/ND, +BS  Extremities - No C/C, no calf tenderness, BLE 1+ edema  Neuro-     Cognitive - awake, alert, oriented to person, place, date, year, and situation.  Able  to follow command          Cranial Nerves - intact     Motor - No focal deficits                    LEFT    UE - ShAB 5/5, EF 5/5, EE 5/5,  5/5                    RIGHT UE - ShAB 5/5, EF 5/5, EE 5/5,   5/5                    LEFT    LE - HF 5/5, KE 4/5, DF 5/5, PF 5/5                    RIGHT LE - HF 5/5, KE 4+/5, DF 5/5, PF 5/5        Sensory - Intact  to LT      Tone - normal  Psychiatric - Mood stable, Affect WNL  Skin:  LUE bruising, BLE ecchymosis, Westley TKR open to air with staples - incisions intact

## 2024-09-27 NOTE — PROGRESS NOTE ADULT - PROVIDER SPECIALTY LIST ADULT
Hospitalist
Physiatry
Physiatry
Hospitalist
Physiatry
Hospitalist
Physiatry
Hospitalist

## 2024-09-27 NOTE — PROGRESS NOTE ADULT - TIME BILLING
Time spent includes direct patient care  (interview and examination of patient), discussion with other providers, support staff and/or patient's family members, review of medical records, ordering diagnostic tests and analyzing results, and documentation.
This includes reviewing results/imaging and discussions with specialists, nursing, case management/social work. Further tests, medications, and procedures have been ordered as indicated. Results and the plan of care were communicated to the patient and/or their family member. Supporting documentation was completed and added to the patient's chart.
- Ordering, reviewing, and interpreting labs, testing, and imaging.  - Independently obtaining a review of systems and performing a physical exam  - Reviewing prior hospitalization and where necessary, outpatient records.  - Counselling and educating patient and family regarding interpretation of aforementioned items and plan of care.
Time spent includes direct patient care  (interview and examination of patient), discussion with other providers, support staff and/or patient's family members, review of medical records, ordering diagnostic tests and analyzing results, and documentation.
- Ordering, reviewing, and interpreting labs, testing, and imaging.  - Independently obtaining a review of systems and performing a physical exam  - Reviewing prior hospitalization and where necessary, outpatient records.  - Counselling and educating patient and family regarding interpretation of aforementioned items and plan of care.
Time spent includes direct patient care  (interview and examination of patient), discussion with other providers, support staff and/or patient's family members, review of medical records, ordering diagnostic tests and analyzing results, and documentation.

## 2024-10-30 PROCEDURE — 80053 COMPREHEN METABOLIC PANEL: CPT

## 2024-10-30 PROCEDURE — 97112 NEUROMUSCULAR REEDUCATION: CPT | Mod: GP

## 2024-10-30 PROCEDURE — 85025 COMPLETE CBC W/AUTO DIFF WBC: CPT

## 2024-10-30 PROCEDURE — 97116 GAIT TRAINING THERAPY: CPT | Mod: GP

## 2024-10-30 PROCEDURE — 87635 SARS-COV-2 COVID-19 AMP PRB: CPT

## 2024-10-30 PROCEDURE — 73562 X-RAY EXAM OF KNEE 3: CPT

## 2024-10-30 PROCEDURE — 82607 VITAMIN B-12: CPT

## 2024-10-30 PROCEDURE — 97110 THERAPEUTIC EXERCISES: CPT | Mod: GO

## 2024-10-30 PROCEDURE — 82746 ASSAY OF FOLIC ACID SERUM: CPT

## 2024-10-30 PROCEDURE — 97530 THERAPEUTIC ACTIVITIES: CPT | Mod: GP

## 2024-10-30 PROCEDURE — 97535 SELF CARE MNGMENT TRAINING: CPT | Mod: GO

## 2024-10-30 PROCEDURE — 36415 COLL VENOUS BLD VENIPUNCTURE: CPT

## 2024-10-30 PROCEDURE — 97165 OT EVAL LOW COMPLEX 30 MIN: CPT | Mod: GO

## 2024-10-30 PROCEDURE — 97161 PT EVAL LOW COMPLEX 20 MIN: CPT | Mod: GP

## 2024-10-30 PROCEDURE — 83550 IRON BINDING TEST: CPT

## 2024-10-30 PROCEDURE — 83540 ASSAY OF IRON: CPT

## 2024-10-30 PROCEDURE — 82728 ASSAY OF FERRITIN: CPT

## (undated) DEVICE — POLY TRAP ETRAP

## (undated) DEVICE — CANISTER SUCTION 1200CC 10/SL

## (undated) DEVICE — SYR LUER SLIP TIP 30CC

## (undated) DEVICE — TUBE O2 SUPL CRUSH RESIS CONN SOUTHSIDE ONLY

## (undated) DEVICE — SNARE LRG

## (undated) DEVICE — FORCEP RADIAL JAW 4 W NDL 2.4MM 2.8MM 240CM ORANGE DISP

## (undated) DEVICE — PACK IV START WITH CHG

## (undated) DEVICE — SYR LUER SLIP TIP 50CC

## (undated) DEVICE — SNARE POLYP SENS 27MM 240CM

## (undated) DEVICE — TUBING IV SET SECONDARY 34"

## (undated) DEVICE — TUBING IV SET GRAVITY 3Y 100" MACRO

## (undated) DEVICE — MARKER ENDO SPOT EX

## (undated) DEVICE — ENDOCUFF VISION SZ 3 SM PRPL

## (undated) DEVICE — CATH IV SAFE BC 22G X 1" (BLUE)

## (undated) DEVICE — SENSOR O2 FINGER XL ADULT 24/BX 6BX/CA

## (undated) DEVICE — MASK OXYGEN PANORAMIC

## (undated) DEVICE — STERIS DEFENDO 3-PIECE KIT (AIR/WATER, SUCTION & BIOPSY VALVES)

## (undated) DEVICE — TRAP QUICK CATCH  SINGL CHAMBER

## (undated) DEVICE — NDL INJ SCLERO INTERJECT 23G

## (undated) DEVICE — ELCTR GROUNDING PAD ADULT COVIDIEN

## (undated) DEVICE — TUBING ENDO EXT OLYMPUS 160 24HR USE

## (undated) DEVICE — SYR IV POSIFLUSH NS 3ML 30/TY

## (undated) DEVICE — Device

## (undated) DEVICE — ENDOCUFF VISION SZ 2 LG GRN

## (undated) DEVICE — SUT HEWSON RETRIEVER

## (undated) DEVICE — FORMALIN CUPS 10% BUFFERED

## (undated) DEVICE — TUBING SUCTION CONN 6FT STERILE

## (undated) DEVICE — TUBING CANNULA SALTER LABS NASAL ADULT 7FT

## (undated) DEVICE — VALVE BIOPSY

## (undated) DEVICE — RETRIEVER ROTH NET PLATINUM-UNIVERSAL

## (undated) DEVICE — SUCTION YANKAUER TAPERED BULBOUS NO VENT

## (undated) DEVICE — FORCEP RADIAL JAW 4 JUMBO 2.8MM 3.2MM 240CM ORANGE DISP

## (undated) DEVICE — CATH IV SAFE BC 20G X 1.16" (PINK)

## (undated) DEVICE — SOL IRR POUR H2O 500ML

## (undated) DEVICE — BRUSH COLONOSCOPY CYTOLOGY

## (undated) DEVICE — MASK O2 NON REBREATH 3IN1 ADULT